# Patient Record
Sex: FEMALE | Race: BLACK OR AFRICAN AMERICAN | NOT HISPANIC OR LATINO | URBAN - METROPOLITAN AREA
[De-identification: names, ages, dates, MRNs, and addresses within clinical notes are randomized per-mention and may not be internally consistent; named-entity substitution may affect disease eponyms.]

---

## 2024-08-05 ENCOUNTER — HOSPITAL ENCOUNTER (OUTPATIENT)
Facility: HOSPITAL | Age: 34
Discharge: HOME | End: 2024-08-05
Attending: STUDENT IN AN ORGANIZED HEALTH CARE EDUCATION/TRAINING PROGRAM | Admitting: STUDENT IN AN ORGANIZED HEALTH CARE EDUCATION/TRAINING PROGRAM
Payer: COMMERCIAL

## 2024-08-05 VITALS
TEMPERATURE: 97.3 F | BODY MASS INDEX: 43.23 KG/M2 | DIASTOLIC BLOOD PRESSURE: 77 MMHG | HEART RATE: 97 BPM | WEIGHT: 268.96 LBS | RESPIRATION RATE: 22 BRPM | OXYGEN SATURATION: 98 % | HEIGHT: 66 IN | SYSTOLIC BLOOD PRESSURE: 129 MMHG

## 2024-08-05 PROCEDURE — 2500000001 HC RX 250 WO HCPCS SELF ADMINISTERED DRUGS (ALT 637 FOR MEDICARE OP): Performed by: FAMILY MEDICINE

## 2024-08-05 PROCEDURE — 87086 URINE CULTURE/COLONY COUNT: CPT | Performed by: FAMILY MEDICINE

## 2024-08-05 PROCEDURE — 59025 FETAL NON-STRESS TEST: CPT | Performed by: FAMILY MEDICINE

## 2024-08-05 PROCEDURE — 99213 OFFICE O/P EST LOW 20 MIN: CPT | Performed by: FAMILY MEDICINE

## 2024-08-05 PROCEDURE — 87077 CULTURE AEROBIC IDENTIFY: CPT | Performed by: FAMILY MEDICINE

## 2024-08-05 PROCEDURE — 99215 OFFICE O/P EST HI 40 MIN: CPT | Mod: 25

## 2024-08-05 RX ORDER — CYCLOBENZAPRINE HCL 10 MG
10 TABLET ORAL ONCE
Status: COMPLETED | OUTPATIENT
Start: 2024-08-05 | End: 2024-08-05

## 2024-08-05 RX ORDER — HYDRALAZINE HYDROCHLORIDE 20 MG/ML
5 INJECTION INTRAMUSCULAR; INTRAVENOUS ONCE AS NEEDED
Status: DISCONTINUED | OUTPATIENT
Start: 2024-08-05 | End: 2024-08-05 | Stop reason: HOSPADM

## 2024-08-05 RX ORDER — NIFEDIPINE 10 MG/1
10 CAPSULE ORAL ONCE AS NEEDED
Status: DISCONTINUED | OUTPATIENT
Start: 2024-08-05 | End: 2024-08-05 | Stop reason: HOSPADM

## 2024-08-05 RX ORDER — ACETAMINOPHEN 325 MG/1
975 TABLET ORAL ONCE
Status: COMPLETED | OUTPATIENT
Start: 2024-08-05 | End: 2024-08-05

## 2024-08-05 RX ORDER — LIDOCAINE HYDROCHLORIDE 10 MG/ML
0.5 INJECTION INFILTRATION; PERINEURAL ONCE AS NEEDED
Status: DISCONTINUED | OUTPATIENT
Start: 2024-08-05 | End: 2024-08-05 | Stop reason: HOSPADM

## 2024-08-05 RX ORDER — LABETALOL HYDROCHLORIDE 5 MG/ML
20 INJECTION, SOLUTION INTRAVENOUS ONCE AS NEEDED
Status: DISCONTINUED | OUTPATIENT
Start: 2024-08-05 | End: 2024-08-05 | Stop reason: HOSPADM

## 2024-08-05 SDOH — SOCIAL STABILITY: SOCIAL INSECURITY: DO YOU FEEL ANYONE HAS EXPLOITED OR TAKEN ADVANTAGE OF YOU FINANCIALLY OR OF YOUR PERSONAL PROPERTY?: NO

## 2024-08-05 SDOH — ECONOMIC STABILITY: HOUSING INSECURITY: DO YOU FEEL UNSAFE GOING BACK TO THE PLACE WHERE YOU ARE LIVING?: NO

## 2024-08-05 SDOH — SOCIAL STABILITY: SOCIAL INSECURITY: ABUSE SCREEN: ADULT

## 2024-08-05 SDOH — SOCIAL STABILITY: SOCIAL INSECURITY: HAVE YOU HAD THOUGHTS OF HARMING ANYONE ELSE?: NO

## 2024-08-05 SDOH — SOCIAL STABILITY: SOCIAL INSECURITY: VERBAL ABUSE: DENIES

## 2024-08-05 SDOH — HEALTH STABILITY: MENTAL HEALTH: HAVE YOU USED ANY SUBSTANCES (CANABIS, COCAINE, HEROIN, HALLUCINOGENS, INHALANTS, ETC.) IN THE PAST 12 MONTHS?: NO

## 2024-08-05 SDOH — HEALTH STABILITY: MENTAL HEALTH: NON-SPECIFIC ACTIVE SUICIDAL THOUGHTS (PAST 1 MONTH): NO

## 2024-08-05 SDOH — HEALTH STABILITY: MENTAL HEALTH: WISH TO BE DEAD (PAST 1 MONTH): NO

## 2024-08-05 SDOH — SOCIAL STABILITY: SOCIAL INSECURITY: HAS ANYONE EVER THREATENED TO HURT YOUR FAMILY OR YOUR PETS?: NO

## 2024-08-05 SDOH — SOCIAL STABILITY: SOCIAL INSECURITY: ARE THERE ANY APPARENT SIGNS OF INJURIES/BEHAVIORS THAT COULD BE RELATED TO ABUSE/NEGLECT?: NO

## 2024-08-05 SDOH — HEALTH STABILITY: MENTAL HEALTH: WERE YOU ABLE TO COMPLETE ALL THE BEHAVIORAL HEALTH SCREENINGS?: YES

## 2024-08-05 SDOH — HEALTH STABILITY: MENTAL HEALTH: SUICIDAL BEHAVIOR (LIFETIME): NO

## 2024-08-05 SDOH — SOCIAL STABILITY: SOCIAL INSECURITY: DOES ANYONE TRY TO KEEP YOU FROM HAVING/CONTACTING OTHER FRIENDS OR DOING THINGS OUTSIDE YOUR HOME?: NO

## 2024-08-05 SDOH — SOCIAL STABILITY: SOCIAL INSECURITY: PHYSICAL ABUSE: DENIES

## 2024-08-05 SDOH — SOCIAL STABILITY: SOCIAL INSECURITY: ARE YOU OR HAVE YOU BEEN THREATENED OR ABUSED PHYSICALLY, EMOTIONALLY, OR SEXUALLY BY ANYONE?: NO

## 2024-08-05 SDOH — SOCIAL STABILITY: SOCIAL INSECURITY: HAVE YOU HAD ANY THOUGHTS OF HARMING ANYONE ELSE?: NO

## 2024-08-05 SDOH — HEALTH STABILITY: MENTAL HEALTH: HAVE YOU USED ANY PRESCRIPTION DRUGS OTHER THAN PRESCRIBED IN THE PAST 12 MONTHS?: NO

## 2024-08-05 ASSESSMENT — LIFESTYLE VARIABLES
SKIP TO QUESTIONS 9-10: 1
HOW MANY STANDARD DRINKS CONTAINING ALCOHOL DO YOU HAVE ON A TYPICAL DAY: PATIENT DOES NOT DRINK
AUDIT-C TOTAL SCORE: 0
HOW OFTEN DO YOU HAVE A DRINK CONTAINING ALCOHOL: NEVER
HOW OFTEN DO YOU HAVE 6 OR MORE DRINKS ON ONE OCCASION: NEVER
AUDIT-C TOTAL SCORE: 0

## 2024-08-05 ASSESSMENT — PATIENT HEALTH QUESTIONNAIRE - PHQ9
1. LITTLE INTEREST OR PLEASURE IN DOING THINGS: NOT AT ALL
SUM OF ALL RESPONSES TO PHQ9 QUESTIONS 1 & 2: 0
2. FEELING DOWN, DEPRESSED OR HOPELESS: NOT AT ALL

## 2024-08-05 NOTE — H&P
Obstetrical TRIAGE History and Physical     Dot Niño is a 33 y.o. . 38w4d dated by 7w6d US. Receives prenatal care at [facility]. Presenting to OB triage for abd pain.       Chief Complaint: No chief complaint on file.    Assessment/Plan      Abd pain  - generalized abd pain started early this AM  - SVE: fingertip/30/-3  - 8/10 - >Tylenol, Flexeril -> 5/10  - GBS collected  - low concern for labor at this time  - discussed labor signs, FMCs, warning signs, when to return for eval, pt verb understanding    IUP at 38w4d  - NST reactive   - Good fetal movement  - Continue routine prenatal care    Maternal Well-being  - Vital signs stable and WNL   - All questions and concerns addressed  - rubella equivocal, to offer vaccine post partum    Dispo  - patient appropriate for d/c home, agrees with plan   - f/u at next scheduled OB appt or to triage sooner as needed       Discussed plan and reviewed tracing with Dr. Colon-Von Abreu, APRN-CNP      Active Problems:  There are no active Hospital Problems.      Pregnancy Problems (from 24 to present)       No problems associated with this episode.          Subjective   Dot is here for abd pain that started early this morning. Denies loss of fluid, vaginal bleeding, decreased fetal movement. She has not taken medication for the sx.         Obstetrical History   OB History    Para Term  AB Living   4 3 3     2   SAB IAB Ectopic Multiple Live Births           2      # Outcome Date GA Lbr Joseph/2nd Weight Sex Type Anes PTL Lv   4 Current            3 Term 12/12/15     Vag-Spont      2 Term 14     Vag-Spont   EFRA   1 Term 13     Vag-Spont   EFRA       Past Medical History  Past Medical History:   Diagnosis Date    Encounter for full-term uncomplicated delivery ([facility]) 2014    Normal delivery    Encounter for screening for malignant neoplasm of cervix 2022    Cervical cancer screening    Encounter for supervision of  normal pregnancy, unspecified, unspecified trimester ([facility]) 05/12/2014    Pregnancy    Personal history of diseases of the blood and blood-forming organs and certain disorders involving the immune mechanism     History of anemia    Personal history of other endocrine, nutritional and metabolic disease     History of obesity    Personal history of other specified conditions     History of vomiting    Urinary tract infection, site not specified 01/03/2014    Urinary tract infection        Past Surgical History   No past surgical history on file.    Social History  Social History     Tobacco Use    Smoking status: Not on file    Smokeless tobacco: Not on file   Substance Use Topics    Alcohol use: Not on file     Substance and Sexual Activity   Drug Use Not on file       Allergies  Patient has no known allergies.     Medications  No medications prior to admission.       Objective    Last Vitals  Temp Pulse Resp BP MAP O2 Sat   36.3 °C (97.3 °F) 97 22 129/77 97 98 %     Physical Examination  Physical Exam  Constitutional:       Appearance: Normal appearance.   HENT:      Head: Normocephalic.   Cardiovascular:      Rate and Rhythm: Normal rate.      Heart sounds: Normal heart sounds.   Pulmonary:      Effort: Pulmonary effort is normal.   Abdominal:      Palpations: Abdomen is soft.   Genitourinary:     Comments: , mod variability, +accels, -decels  TOCO irreg ctx    SVE: fingertip/30/-3    Skin:     General: Skin is warm and dry.   Neurological:      Mental Status: She is alert and oriented to person, place, and time.   Psychiatric:         Mood and Affect: Mood normal.         Behavior: Behavior normal.         Lab Review  No visits with results within 1 Day(s) from this visit.   Latest known visit with results is:   Legacy Encounter on 02/09/2023   Component Date Value Ref Range Status    Trichomonas Vaginalis 02/09/2023 NEGATIVE  Negative Final    Comment:  The APTIMA Trichomonas vaginalis assay is  FDA-approved for    testing on female endocervical swabs, vaginal swabs, and    ThinPrep liquid pap samples. Performance characteristics    for Trichomonas vaginalis on specific non-FDA-approved    sample types (female and male urine and male urethral    swabs) have been validated by [facility]. This laboratory is certified by    CLIA to perform high complexity testing. Samples from all    other sites are not validated for this method.      Neisseria gonorrhea,Amplified 02/09/2023 NEGATIVE  Negative Final    Comment:  The APTIMA Combo 2 assay is FDA-approved for Chlamydia    trachomatis and Neisseria gonorrhoeae testing on female    endocervical and vaginal swabs, ThinPrep liquid pap    samples, male urine samples and urethral swabs.    Performance characteristics for Chlamydia trachomatis and    Neisseria gonorrhoeae testing on specific non-FDA-approved    sample types (female urine samples) have been validated by    [facility]. This    laboratory is certified by CLIA to perform high complexity    testing. Samples from all other sites are not validated    for this method.      Chlamydia trachomatis, Amplified 02/09/2023 NEGATIVE  Negative Final    Comment:  The APTIMA Combo 2 assay is FDA-approved for Chlamydia    trachomatis and Neisseria gonorrhoeae testing on female    endocervical and vaginal swabs, ThinPrep liquid pap    samples, male urine samples and urethral swabs.    Performance characteristics for Chlamydia trachomatis and    Neisseria gonorrhoeae testing on specific non-FDA-approved    sample types (female urine samples) have been validated by    [facility]. This    laboratory is certified by CLIA to perform high complexity    testing. Samples from all other sites are not validated    for this method.

## 2024-08-06 LAB — BACTERIA UR CULT: NORMAL

## 2024-08-08 LAB — GP B STREP GENITAL QL CULT: ABNORMAL

## 2024-08-12 ENCOUNTER — TELEPHONE (OUTPATIENT)
Dept: OBSTETRICS AND GYNECOLOGY | Facility: HOSPITAL | Age: 34
End: 2024-08-12
Payer: COMMERCIAL

## 2024-08-16 ENCOUNTER — ANESTHESIA EVENT (OUTPATIENT)
Dept: OBSTETRICS AND GYNECOLOGY | Facility: HOSPITAL | Age: 34
End: 2024-08-16
Payer: COMMERCIAL

## 2024-08-16 ENCOUNTER — HOSPITAL ENCOUNTER (INPATIENT)
Facility: HOSPITAL | Age: 34
End: 2024-08-16
Attending: STUDENT IN AN ORGANIZED HEALTH CARE EDUCATION/TRAINING PROGRAM | Admitting: STUDENT IN AN ORGANIZED HEALTH CARE EDUCATION/TRAINING PROGRAM
Payer: COMMERCIAL

## 2024-08-16 ENCOUNTER — ANESTHESIA (OUTPATIENT)
Dept: OBSTETRICS AND GYNECOLOGY | Facility: HOSPITAL | Age: 34
End: 2024-08-16
Payer: COMMERCIAL

## 2024-08-16 DIAGNOSIS — D62 ABLA (ACUTE BLOOD LOSS ANEMIA): ICD-10-CM

## 2024-08-16 PROBLEM — J45.41 MODERATE PERSISTENT ASTHMA WITH ACUTE EXACERBATION (HHS-HCC): Status: ACTIVE | Noted: 2024-08-16

## 2024-08-16 LAB
ABO GROUP (TYPE) IN BLOOD: NORMAL
ANTIBODY SCREEN: NORMAL
BILIRUBIN, POC: NEGATIVE
BLOOD URINE, POC: NEGATIVE
CLARITY, POC: CLEAR
COLOR, POC: YELLOW
ERYTHROCYTE [DISTWIDTH] IN BLOOD BY AUTOMATED COUNT: 13.3 % (ref 11.5–14.5)
GLUCOSE URINE, POC: NEGATIVE
HCT VFR BLD AUTO: 33.7 % (ref 36–46)
HGB BLD-MCNC: 10.7 G/DL (ref 12–16)
KETONES, POC: NEGATIVE
LEUKOCYTE EST, POC: NEGATIVE
MCH RBC QN AUTO: 26.6 PG (ref 26–34)
MCHC RBC AUTO-ENTMCNC: 31.8 G/DL (ref 32–36)
MCV RBC AUTO: 84 FL (ref 80–100)
NITRITE, POC: NEGATIVE
NRBC BLD-RTO: 0 /100 WBCS (ref 0–0)
PH, POC: 7
PLATELET # BLD AUTO: 307 X10*3/UL (ref 150–450)
POC APPEARANCE OF BODY FLUID: CLEAR
RBC # BLD AUTO: 4.02 X10*6/UL (ref 4–5.2)
RH FACTOR (ANTIGEN D): NORMAL
SPECIFIC GRAVITY, POC: 1.02
TREPONEMA PALLIDUM IGG+IGM AB [PRESENCE] IN SERUM OR PLASMA BY IMMUNOASSAY: NONREACTIVE
URINE PROTEIN, POC: NEGATIVE
UROBILINOGEN, POC: 1
WBC # BLD AUTO: 4.6 X10*3/UL (ref 4.4–11.3)

## 2024-08-16 PROCEDURE — 86780 TREPONEMA PALLIDUM: CPT

## 2024-08-16 PROCEDURE — 36415 COLL VENOUS BLD VENIPUNCTURE: CPT

## 2024-08-16 PROCEDURE — 3700000014 EPIDURAL BLOCK: Performed by: ANESTHESIOLOGY

## 2024-08-16 PROCEDURE — 86870 RBC ANTIBODY IDENTIFICATION: CPT

## 2024-08-16 PROCEDURE — 85027 COMPLETE CBC AUTOMATED: CPT

## 2024-08-16 PROCEDURE — 1120000001 HC OB PRIVATE ROOM DAILY

## 2024-08-16 PROCEDURE — 01967 NEURAXL LBR ANES VAG DLVR: CPT | Performed by: ANESTHESIOLOGY

## 2024-08-16 PROCEDURE — 81002 URINALYSIS NONAUTO W/O SCOPE: CPT

## 2024-08-16 PROCEDURE — 86901 BLOOD TYPING SEROLOGIC RH(D): CPT

## 2024-08-16 PROCEDURE — 2500000005 HC RX 250 GENERAL PHARMACY W/O HCPCS

## 2024-08-16 PROCEDURE — 7210000002 HC LABOR PER HOUR

## 2024-08-16 PROCEDURE — 2500000004 HC RX 250 GENERAL PHARMACY W/ HCPCS (ALT 636 FOR OP/ED)

## 2024-08-16 PROCEDURE — 2500000001 HC RX 250 WO HCPCS SELF ADMINISTERED DRUGS (ALT 637 FOR MEDICARE OP)

## 2024-08-16 PROCEDURE — 3E033VJ INTRODUCTION OF OTHER HORMONE INTO PERIPHERAL VEIN, PERCUTANEOUS APPROACH: ICD-10-PCS

## 2024-08-16 PROCEDURE — 99214 OFFICE O/P EST MOD 30 MIN: CPT

## 2024-08-16 PROCEDURE — 2500000002 HC RX 250 W HCPCS SELF ADMINISTERED DRUGS (ALT 637 FOR MEDICARE OP, ALT 636 FOR OP/ED)

## 2024-08-16 PROCEDURE — 2500000002 HC RX 250 W HCPCS SELF ADMINISTERED DRUGS (ALT 637 FOR MEDICARE OP, ALT 636 FOR OP/ED): Performed by: STUDENT IN AN ORGANIZED HEALTH CARE EDUCATION/TRAINING PROGRAM

## 2024-08-16 PROCEDURE — 86922 COMPATIBILITY TEST ANTIGLOB: CPT

## 2024-08-16 RX ORDER — FENTANYL/BUPIVACAINE/NS/PF 2MCG/ML-.1
PLASTIC BAG, INJECTION (ML) INJECTION AS NEEDED
Status: DISCONTINUED | OUTPATIENT
Start: 2024-08-16 | End: 2024-08-16

## 2024-08-16 RX ORDER — IPRATROPIUM BROMIDE AND ALBUTEROL SULFATE 2.5; .5 MG/3ML; MG/3ML
3 SOLUTION RESPIRATORY (INHALATION) ONCE
Status: COMPLETED | OUTPATIENT
Start: 2024-08-16 | End: 2024-08-16

## 2024-08-16 RX ORDER — ONDANSETRON 4 MG/1
4 TABLET, FILM COATED ORAL EVERY 6 HOURS PRN
Status: DISCONTINUED | OUTPATIENT
Start: 2024-08-16 | End: 2024-08-17

## 2024-08-16 RX ORDER — METOCLOPRAMIDE HYDROCHLORIDE 5 MG/ML
10 INJECTION INTRAMUSCULAR; INTRAVENOUS EVERY 6 HOURS PRN
Status: DISCONTINUED | OUTPATIENT
Start: 2024-08-16 | End: 2024-08-17

## 2024-08-16 RX ORDER — TERBUTALINE SULFATE 1 MG/ML
0.25 INJECTION SUBCUTANEOUS ONCE AS NEEDED
Status: DISCONTINUED | OUTPATIENT
Start: 2024-08-16 | End: 2024-08-17

## 2024-08-16 RX ORDER — METHYLERGONOVINE MALEATE 0.2 MG/ML
0.2 INJECTION INTRAVENOUS ONCE AS NEEDED
Status: DISCONTINUED | OUTPATIENT
Start: 2024-08-16 | End: 2024-08-17

## 2024-08-16 RX ORDER — HYDRALAZINE HYDROCHLORIDE 20 MG/ML
5 INJECTION INTRAMUSCULAR; INTRAVENOUS ONCE AS NEEDED
Status: DISCONTINUED | OUTPATIENT
Start: 2024-08-16 | End: 2024-08-17

## 2024-08-16 RX ORDER — METOCLOPRAMIDE 10 MG/1
10 TABLET ORAL EVERY 6 HOURS PRN
Status: DISCONTINUED | OUTPATIENT
Start: 2024-08-16 | End: 2024-08-17

## 2024-08-16 RX ORDER — OXYTOCIN/0.9 % SODIUM CHLORIDE 30/500 ML
2-30 PLASTIC BAG, INJECTION (ML) INTRAVENOUS CONTINUOUS
Status: DISCONTINUED | OUTPATIENT
Start: 2024-08-16 | End: 2024-08-17

## 2024-08-16 RX ORDER — LOPERAMIDE HYDROCHLORIDE 2 MG/1
4 CAPSULE ORAL EVERY 2 HOUR PRN
Status: DISCONTINUED | OUTPATIENT
Start: 2024-08-16 | End: 2024-08-17

## 2024-08-16 RX ORDER — NIFEDIPINE 10 MG/1
10 CAPSULE ORAL ONCE AS NEEDED
Status: DISCONTINUED | OUTPATIENT
Start: 2024-08-16 | End: 2024-08-17

## 2024-08-16 RX ORDER — LABETALOL HYDROCHLORIDE 5 MG/ML
20 INJECTION, SOLUTION INTRAVENOUS ONCE AS NEEDED
Status: DISCONTINUED | OUTPATIENT
Start: 2024-08-16 | End: 2024-08-17

## 2024-08-16 RX ORDER — SWAB
1 SWAB, NON-MEDICATED MISCELLANEOUS
COMMUNITY
Start: 2024-01-18 | End: 2024-08-20 | Stop reason: HOSPADM

## 2024-08-16 RX ORDER — FENTANYL/BUPIVACAINE/NS/PF 2MCG/ML-.1
PLASTIC BAG, INJECTION (ML) INJECTION AS NEEDED
Status: DISCONTINUED | OUTPATIENT
Start: 2024-08-16 | End: 2024-08-17

## 2024-08-16 RX ORDER — SODIUM CHLORIDE, SODIUM LACTATE, POTASSIUM CHLORIDE, CALCIUM CHLORIDE 600; 310; 30; 20 MG/100ML; MG/100ML; MG/100ML; MG/100ML
125 INJECTION, SOLUTION INTRAVENOUS CONTINUOUS
Status: DISCONTINUED | OUTPATIENT
Start: 2024-08-16 | End: 2024-08-17

## 2024-08-16 RX ORDER — FENTANYL/BUPIVACAINE/NS/PF 2MCG/ML-.1
0-54 PLASTIC BAG, INJECTION (ML) INJECTION CONTINUOUS
Status: DISCONTINUED | OUTPATIENT
Start: 2024-08-17 | End: 2024-08-17

## 2024-08-16 RX ORDER — OXYTOCIN/0.9 % SODIUM CHLORIDE 30/500 ML
60 PLASTIC BAG, INJECTION (ML) INTRAVENOUS ONCE AS NEEDED
Status: DISCONTINUED | OUTPATIENT
Start: 2024-08-16 | End: 2024-08-17

## 2024-08-16 RX ORDER — TRANEXAMIC ACID 100 MG/ML
1000 INJECTION, SOLUTION INTRAVENOUS ONCE AS NEEDED
Status: DISCONTINUED | OUTPATIENT
Start: 2024-08-16 | End: 2024-08-17

## 2024-08-16 RX ORDER — ONDANSETRON HYDROCHLORIDE 2 MG/ML
4 INJECTION, SOLUTION INTRAVENOUS EVERY 6 HOURS PRN
Status: DISCONTINUED | OUTPATIENT
Start: 2024-08-16 | End: 2024-08-17

## 2024-08-16 RX ORDER — IPRATROPIUM BROMIDE AND ALBUTEROL SULFATE 2.5; .5 MG/3ML; MG/3ML
3 SOLUTION RESPIRATORY (INHALATION)
Status: DISCONTINUED | OUTPATIENT
Start: 2024-08-16 | End: 2024-08-20 | Stop reason: HOSPADM

## 2024-08-16 RX ORDER — LIDOCAINE HYDROCHLORIDE 10 MG/ML
30 INJECTION INFILTRATION; PERINEURAL ONCE AS NEEDED
Status: DISCONTINUED | OUTPATIENT
Start: 2024-08-16 | End: 2024-08-17

## 2024-08-16 RX ORDER — CARBOPROST TROMETHAMINE 250 UG/ML
250 INJECTION, SOLUTION INTRAMUSCULAR ONCE AS NEEDED
Status: DISCONTINUED | OUTPATIENT
Start: 2024-08-16 | End: 2024-08-17

## 2024-08-16 RX ORDER — FLUTICASONE FUROATE AND VILANTEROL 200; 25 UG/1; UG/1
1 POWDER RESPIRATORY (INHALATION)
Status: DISCONTINUED | OUTPATIENT
Start: 2024-08-16 | End: 2024-08-20 | Stop reason: HOSPADM

## 2024-08-16 RX ORDER — LIDOCAINE HYDROCHLORIDE 10 MG/ML
0.5 INJECTION INFILTRATION; PERINEURAL ONCE AS NEEDED
Status: DISCONTINUED | OUTPATIENT
Start: 2024-08-16 | End: 2024-08-17

## 2024-08-16 RX ORDER — OXYTOCIN 10 [USP'U]/ML
10 INJECTION, SOLUTION INTRAMUSCULAR; INTRAVENOUS ONCE AS NEEDED
Status: DISCONTINUED | OUTPATIENT
Start: 2024-08-16 | End: 2024-08-17

## 2024-08-16 RX ORDER — MISOPROSTOL 200 UG/1
800 TABLET ORAL ONCE AS NEEDED
Status: DISCONTINUED | OUTPATIENT
Start: 2024-08-16 | End: 2024-08-17

## 2024-08-16 RX ORDER — PENICILLIN G 3000000 [IU]/50ML
3 INJECTION, SOLUTION INTRAVENOUS EVERY 4 HOURS
Status: DISCONTINUED | OUTPATIENT
Start: 2024-08-16 | End: 2024-08-17

## 2024-08-16 RX ORDER — ACETAMINOPHEN 325 MG/1
975 TABLET ORAL EVERY 6 HOURS PRN
Status: DISCONTINUED | OUTPATIENT
Start: 2024-08-16 | End: 2024-08-17

## 2024-08-16 RX ORDER — LIDOCAINE HCL/EPINEPHRINE/PF 2%-1:200K
VIAL (ML) INJECTION AS NEEDED
Status: DISCONTINUED | OUTPATIENT
Start: 2024-08-16 | End: 2024-08-17

## 2024-08-16 RX ORDER — BUDESONIDE AND FORMOTEROL FUMARATE DIHYDRATE 160; 4.5 UG/1; UG/1
2 AEROSOL RESPIRATORY (INHALATION) 2 TIMES DAILY
COMMUNITY
Start: 2024-05-22

## 2024-08-16 RX ADMIN — FENTANYL CITRATE 5 ML: 50 INJECTION INTRAMUSCULAR; INTRAVENOUS at 23:49

## 2024-08-16 RX ADMIN — LIDOCAINE HYDROCHLORIDE,EPINEPHRINE BITARTRATE 3 ML: 20; .005 INJECTION, SOLUTION EPIDURAL; INFILTRATION; INTRACAUDAL; PERINEURAL at 23:45

## 2024-08-16 RX ADMIN — FENTANYL CITRATE 14 ML/HR: 50 INJECTION, SOLUTION INTRAMUSCULAR; INTRAVENOUS at 23:51

## 2024-08-16 RX ADMIN — FENTANYL CITRATE 5 ML: 50 INJECTION INTRAMUSCULAR; INTRAVENOUS at 23:47

## 2024-08-16 SDOH — HEALTH STABILITY: MENTAL HEALTH: WISH TO BE DEAD (PAST 1 MONTH): NO

## 2024-08-16 SDOH — HEALTH STABILITY: MENTAL HEALTH: NON-SPECIFIC ACTIVE SUICIDAL THOUGHTS (PAST 1 MONTH): NO

## 2024-08-16 SDOH — SOCIAL STABILITY: SOCIAL INSECURITY: ARE THERE ANY APPARENT SIGNS OF INJURIES/BEHAVIORS THAT COULD BE RELATED TO ABUSE/NEGLECT?: NO

## 2024-08-16 SDOH — SOCIAL STABILITY: SOCIAL INSECURITY: VERBAL ABUSE: DENIES

## 2024-08-16 SDOH — HEALTH STABILITY: MENTAL HEALTH: SUICIDAL BEHAVIOR (LIFETIME): NO

## 2024-08-16 SDOH — HEALTH STABILITY: MENTAL HEALTH: HAVE YOU USED ANY SUBSTANCES (CANABIS, COCAINE, HEROIN, HALLUCINOGENS, INHALANTS, ETC.) IN THE PAST 12 MONTHS?: NO

## 2024-08-16 SDOH — HEALTH STABILITY: MENTAL HEALTH: WERE YOU ABLE TO COMPLETE ALL THE BEHAVIORAL HEALTH SCREENINGS?: YES

## 2024-08-16 SDOH — SOCIAL STABILITY: SOCIAL INSECURITY: HAS ANYONE EVER THREATENED TO HURT YOUR FAMILY OR YOUR PETS?: NO

## 2024-08-16 SDOH — SOCIAL STABILITY: SOCIAL INSECURITY: HAVE YOU HAD THOUGHTS OF HARMING ANYONE ELSE?: NO

## 2024-08-16 SDOH — SOCIAL STABILITY: SOCIAL INSECURITY: PHYSICAL ABUSE: DENIES

## 2024-08-16 SDOH — HEALTH STABILITY: MENTAL HEALTH: CURRENT SMOKER: 0

## 2024-08-16 SDOH — SOCIAL STABILITY: SOCIAL INSECURITY: ABUSE SCREEN: ADULT

## 2024-08-16 SDOH — HEALTH STABILITY: MENTAL HEALTH: HAVE YOU USED ANY PRESCRIPTION DRUGS OTHER THAN PRESCRIBED IN THE PAST 12 MONTHS?: NO

## 2024-08-16 SDOH — SOCIAL STABILITY: SOCIAL INSECURITY: DOES ANYONE TRY TO KEEP YOU FROM HAVING/CONTACTING OTHER FRIENDS OR DOING THINGS OUTSIDE YOUR HOME?: NO

## 2024-08-16 SDOH — SOCIAL STABILITY: SOCIAL INSECURITY: ARE YOU OR HAVE YOU BEEN THREATENED OR ABUSED PHYSICALLY, EMOTIONALLY, OR SEXUALLY BY ANYONE?: NO

## 2024-08-16 SDOH — SOCIAL STABILITY: SOCIAL INSECURITY: DO YOU FEEL ANYONE HAS EXPLOITED OR TAKEN ADVANTAGE OF YOU FINANCIALLY OR OF YOUR PERSONAL PROPERTY?: NO

## 2024-08-16 SDOH — ECONOMIC STABILITY: HOUSING INSECURITY: DO YOU FEEL UNSAFE GOING BACK TO THE PLACE WHERE YOU ARE LIVING?: NO

## 2024-08-16 ASSESSMENT — PAIN SCALES - GENERAL
PAINLEVEL_OUTOF10: 7
PAINLEVEL_OUTOF10: 2
PAINLEVEL_OUTOF10: 4
PAINLEVEL_OUTOF10: 5 - MODERATE PAIN
PAINLEVEL_OUTOF10: 0 - NO PAIN
PAINLEVEL_OUTOF10: 6
PAINLEVEL_OUTOF10: 0 - NO PAIN
PAINLEVEL_OUTOF10: 4
PAINLEVEL_OUTOF10: 0 - NO PAIN

## 2024-08-16 ASSESSMENT — LIFESTYLE VARIABLES
AUDIT-C TOTAL SCORE: 0
HOW MANY STANDARD DRINKS CONTAINING ALCOHOL DO YOU HAVE ON A TYPICAL DAY: PATIENT DOES NOT DRINK
HOW OFTEN DO YOU HAVE 6 OR MORE DRINKS ON ONE OCCASION: NEVER
AUDIT-C TOTAL SCORE: 0
HOW OFTEN DO YOU HAVE A DRINK CONTAINING ALCOHOL: NEVER
SKIP TO QUESTIONS 9-10: 1

## 2024-08-16 ASSESSMENT — ACTIVITIES OF DAILY LIVING (ADL)
PATIENT'S MEMORY ADEQUATE TO SAFELY COMPLETE DAILY ACTIVITIES?: YES
BATHING: INDEPENDENT
ADEQUATE_TO_COMPLETE_ADL: YES
HEARING - RIGHT EAR: FUNCTIONAL
FEEDING YOURSELF: INDEPENDENT
GROOMING: INDEPENDENT
JUDGMENT_ADEQUATE_SAFELY_COMPLETE_DAILY_ACTIVITIES: YES
DRESSING YOURSELF: INDEPENDENT
HEARING - LEFT EAR: FUNCTIONAL
WALKS IN HOME: INDEPENDENT
LACK_OF_TRANSPORTATION: NO
TOILETING: INDEPENDENT

## 2024-08-16 ASSESSMENT — PAIN DESCRIPTION - DESCRIPTORS
DESCRIPTORS: HEADACHE

## 2024-08-16 ASSESSMENT — PATIENT HEALTH QUESTIONNAIRE - PHQ9
1. LITTLE INTEREST OR PLEASURE IN DOING THINGS: NOT AT ALL
2. FEELING DOWN, DEPRESSED OR HOPELESS: NOT AT ALL
SUM OF ALL RESPONSES TO PHQ9 QUESTIONS 1 & 2: 0

## 2024-08-16 ASSESSMENT — PAIN DESCRIPTION - LOCATION: LOCATION: HEAD

## 2024-08-16 NOTE — ANESTHESIA PREPROCEDURE EVALUATION
Patient: Dot Niño    Evaluation Method: In-person visit    Procedure Information    Date: 08/16/24  Procedure: Labor Consult         Relevant Problems   Pulmonary   (+) Moderate persistent asthma with acute exacerbation ([facility])       Clinical information reviewed:   Tobacco  Allergies  Meds   Med Hx  Surg Hx   Fam Hx          NPO Detail:  No data recorded     OB/Gyn Evaluation    Present Pregnancy    Patient is pregnant now.   Obstetric History                Physical Exam    Airway  Mallampati: III  TM distance: <3 FB     Cardiovascular - normal exam     Dental    Pulmonary   (+) wheezes     Abdominal            Anesthesia Plan    History of general anesthesia?: no  History of complications of general anesthesia?: no    ASA 2     epidural     The patient is not a current smoker.  Patient was previously instructed to abstain from smoking on day of procedure.  Patient did not smoke on day of procedure.    Anesthetic plan and risks discussed with patient.  Use of blood products discussed with patient who.    Plan discussed with attending.

## 2024-08-16 NOTE — CARE PLAN
The patient's goals for the shift include Begin induction process    The clinical goals for the shift include Demonstrate effective coping mechanisms through labor    VS and assessment stable. Patient demonstrating effective coping mechanisms.

## 2024-08-16 NOTE — PROGRESS NOTES
Intrapartum Progress Note    Assessment/Plan   Dot Niño is a 33 y.o.  at 40w1d, JULIANA: 8/15/2024, by Patient Reported admitted for IOL at term.    IOL  Method: CRB, Pit now at 4  Pain management: Epidural may be desired, not at this time  CEFM, currently Category I  GBS: positive, PCN indicated  Next exam: with CRB out    Asthma  S/p 2x Duo-Neb; pt symptomatically improved and oxygen saturation appropriate; peak flow improved 150 --> 200  2 hospitalizations this pregnancy for asthma  Estimated peak flow 295-419  Fluticasone inhaler daily, Duo-Neb PRN    Maternal conditions  Asthma, as above    Seen and discussed with Dr. Najma Pimentel MD  OBGYN    Subjective   Dot is feeling good at this time, no acute concerns.  Denies SOB, wheezing, or other pulmonary sx.  Reports that her last deliveries were all quick, less than 10 mins pushing per child.    Objective   Last Vitals:  Temp Pulse Resp BP MAP Pulse Ox   36.9 °C (98.4 °F) 95 19 129/75   100 %     Vitals Min/Max Last 24 Hours:  Temp  Min: 36 °C (96.8 °F)  Max: 36.9 °C (98.4 °F)  Pulse  Min: 75  Max: 115  Resp  Min: 19  Max: 21  BP  Min: 127/76  Max: 136/80    Intake/Output:  No intake or output data in the 24 hours ending 24 1850    Physical Examination:  General: no acute distress  HEENT: normocephalic, atraumatic  Heart: warm and well perfused  Lungs: breathing comfortably on room air  Abdomen: gravid  Extremities: moving all extremities  Neuro: awake and conversant  Psych: appropriate mood and affect    SVE: 50/-3 with CRB placement  FHT: 145/mod/+accel/-decel  [place]: q5min    Lab Review:  Labs in chart have been reviewed    Lab Results   Component Value Date    WBC 4.6 2024    HGB 10.7 (L) 2024    HCT 33.7 (L) 2024     2024    ALT 35 10/28/2017    AST 27 10/28/2017     2020    K 4.7 2020     (H) 2020    CREATININE 0.83 2020    BUN 14 2020    CO2 20 (L)  11/01/2020    INR 1.0 11/01/2020

## 2024-08-16 NOTE — H&P
OB Admission H&P    ASSESSMENT & PLAN: Dot Niño is a 33 y.o.  at 40w1d who presents for contractions, now admitted for IOL at term.    Plan:     IOL  - admitted, consented, scanned for cephalic  - Discussed risks and benefits of vaginal delivery including bleeding, infection, injury to surrounding structures. Pt expressed understanding, strongly desires vaginal delivery.   - Plan for IOL per primary team  - type & screen, CBC on admission   - epidural/IV pain meds per pt request  - PPBC: Depo    Asthma exacerbation   - Increased WOB and wheezing on exam. Oxygen saturation appropriate.  - 2 hospitalizations this pregnancy for asthma exacerbations  - Patient reports taking 3 inhalers, but cannot recall name of one. Symbicort is only med listed in home meds list. Per chart, patient was discharged in  on Symbicort 160 mg 2 puffs BID & PRN up to 8 puffs/day with course of oral prednisone.   - Estimated peak flow 295-419  - Actual peak flow 150 with slight improvement to 170 after Duo-Neb. Will administer additional Duo-Nebs q20 minutes for up to 3 doses as needed  - goal O2 sat >95%    Fetal wellbeing  - CEFM, currently Cat I  - GBS pos. For PCN ppx     Dispo: admit to L&D for IOL    Discussed with Dr. Colon-Von Funez MD, PGY-2      Subjective   32 yo  at 40w1d by 24wk US here for contractions. Patient having contractions every 7-8 minutes since yesterday. No LOF, VB, abnormal discharge, vaginal irritation, hematuria, or flank pain. Good FM.    Feels out of breath with walking, not while laying down. No F/C. Does not feel like she is wheezing.    Prenatal Provider [facility]    Pregnancy notables:  - Asthma. 2 hospitalizations at [facility] this pregnancy, last in . Takes 3 inhalers (symbicort, albuterol, and one she cannot recall). No intubations  - GBS pos  - Declined tdap    OB History    Para Term  AB Living   4 3 3 0 0 3   SAB IAB Ectopic Multiple Live Births   0 0 0 0 3       # Outcome Date GA Lbr Joseph/2nd Weight Sex Type Anes PTL Lv   4 Current            3 Term 12/12/15     Vag-Spont   EFRA   2 Term 05/12/14     Vag-Spont   EFRA   1 Term 01/16/13     Vag-Spont   EFRA       No past surgical history on file.    Social History     Tobacco Use    Smoking status: Not on file    Smokeless tobacco: Not on file   Substance Use Topics    Alcohol use: Not on file       No Known Allergies    Medications Prior to Admission   Medication Sig Dispense Refill Last Dose    budesonide-formoteroL (Symbicort) 160-4.5 mcg/actuation inhaler Inhale 2 puffs twice a day.       prenatal vit-iron fum-folic ac 28 mg iron- 800 mcg tablet Take 1 tablet by mouth once daily.        Objective     Last Vitals  Temp Pulse Resp BP MAP O2 Sat   36 °C (96.8 °F) 90 21 127/76 96 97 %         Physical Exam  General: NAD, mood appropriate  Cardiopulmonary: RRR. Increased work of breathing without posturing. Diffuse wheezing on exam.  Abdomen: Gravid, non-tender  Extremities: Symmetric without significant edema  Cervix: 1 /50 /-3      Fetal Monitoring  Baseline: 145 bpm, Variability: Moderate, Accelerations: Present and Decelerations: None  Uterine Activity: Contractions present, q10-15 minutes  Interpretation: Reactive    Bedside Ultrasound: YES, Findings- cephalic    Labs in chart were reviewed.     UA unremarkable

## 2024-08-16 NOTE — SIGNIFICANT EVENT
"Labor Progress Note    Subjective: Patient resting in bed. Mildly increased WOB, but patient reports her breathing is feeling significantly better. Amenable to CRB at this time.    Objective:  Vitals: /80   Pulse 104   Temp 36 °C (96.8 °F) (Temporal)   Resp 21   Ht 1.651 m (5' 5\")   Wt 124 kg (272 lb 4.3 oz)   SpO2 99%   BMI 45.31 kg/m²   SVE: 1/50/-3  FHT: 140/mod/+accels/intermittent variable decels  [place]: rare contractions  CRB placed successfully via speculum; about 50cc of bleeding when CRB placed    A/P:  IOL  S/p CRB placement, for pit after she eats lunch; AROM as appropriate  CEFM, currently Category II, due to variable decels; reassured by positive accels and moderate variability  ~50cc blood when CRB placed, due to cervical manipulation vs small abruption, but tracing reassuring, will continue to monitor  Epidural as requested  GBS positive, on PCN    Asthma  S/p 2x Duo-Neb; pt symptomatically improved and oxygen saturation appropriate; peak flow improved 150 --> 200  2 hospitalizations this pregnancy for asthma  Estimated peak flow 295-419  Fluticasone inhaler daily, Duo-Neb PRN    Andie Cohen, M4      "

## 2024-08-17 LAB
BASE EXCESS BLDCOV CALC-SCNC: -5.3 MMOL/L (ref -8.1–-0.5)
BLOOD EXPIRATION DATE: NORMAL
BODY TEMPERATURE: 37 DEGREES CELSIUS
DISPENSE STATUS: NORMAL
ERYTHROCYTE [DISTWIDTH] IN BLOOD BY AUTOMATED COUNT: 13.2 % (ref 11.5–14.5)
HCO3 BLDCOV-SCNC: 21.2 MMOL/L (ref 16–26)
HCT VFR BLD AUTO: 26.2 % (ref 36–46)
HGB BLD-MCNC: 8.1 G/DL (ref 12–16)
INHALED O2 CONCENTRATION: 21 %
MCH RBC QN AUTO: 26.6 PG (ref 26–34)
MCHC RBC AUTO-ENTMCNC: 30.9 G/DL (ref 32–36)
MCV RBC AUTO: 86 FL (ref 80–100)
NRBC BLD-RTO: 0 /100 WBCS (ref 0–0)
OXYHGB MFR BLDCOV: 47 % (ref 94–98)
PCO2 BLDCOV: 44 MM HG (ref 22–53)
PH BLDCOV: 7.29 PH (ref 7.19–7.47)
PLATELET # BLD AUTO: 243 X10*3/UL (ref 150–450)
PO2 BLDCOV: 26 MM HG (ref 13–37)
PRODUCT BLOOD TYPE: NORMAL
PRODUCT CODE: NORMAL
RBC # BLD AUTO: 3.05 X10*6/UL (ref 4–5.2)
SAO2 % BLDCOV: 48 % (ref 16–84)
UNIT ABO: NORMAL
UNIT NUMBER: NORMAL
UNIT RH: NORMAL
UNIT VOLUME: 280
WBC # BLD AUTO: 7.7 X10*3/UL (ref 4.4–11.3)
XM INTEP: NORMAL

## 2024-08-17 PROCEDURE — 59514 CESAREAN DELIVERY ONLY: CPT | Performed by: STUDENT IN AN ORGANIZED HEALTH CARE EDUCATION/TRAINING PROGRAM

## 2024-08-17 PROCEDURE — 4A1H7CZ MONITORING OF PRODUCTS OF CONCEPTION, CARDIAC RATE, VIA NATURAL OR ARTIFICIAL OPENING: ICD-10-PCS

## 2024-08-17 PROCEDURE — 7100000016 HC LABOR RECOVERY PER HOUR

## 2024-08-17 PROCEDURE — 2500000002 HC RX 250 W HCPCS SELF ADMINISTERED DRUGS (ALT 637 FOR MEDICARE OP, ALT 636 FOR OP/ED)

## 2024-08-17 PROCEDURE — 2500000004 HC RX 250 GENERAL PHARMACY W/ HCPCS (ALT 636 FOR OP/ED)

## 2024-08-17 PROCEDURE — 88307 TISSUE EXAM BY PATHOLOGIST: CPT | Mod: TC,SUR

## 2024-08-17 PROCEDURE — 2500000005 HC RX 250 GENERAL PHARMACY W/O HCPCS

## 2024-08-17 PROCEDURE — 2500000001 HC RX 250 WO HCPCS SELF ADMINISTERED DRUGS (ALT 637 FOR MEDICARE OP)

## 2024-08-17 PROCEDURE — 3700000014 HC AN EPIDURAL BLOCK CHARGE: Performed by: STUDENT IN AN ORGANIZED HEALTH CARE EDUCATION/TRAINING PROGRAM

## 2024-08-17 PROCEDURE — 2720000007 HC OR 272 NO HCPCS: Performed by: STUDENT IN AN ORGANIZED HEALTH CARE EDUCATION/TRAINING PROGRAM

## 2024-08-17 PROCEDURE — 7100000016 HC LABOR RECOVERY PER HOUR: Performed by: STUDENT IN AN ORGANIZED HEALTH CARE EDUCATION/TRAINING PROGRAM

## 2024-08-17 PROCEDURE — 1120000001 HC OB PRIVATE ROOM DAILY

## 2024-08-17 PROCEDURE — 10H07YZ INSERTION OF OTHER DEVICE INTO PRODUCTS OF CONCEPTION, VIA NATURAL OR ARTIFICIAL OPENING: ICD-10-PCS

## 2024-08-17 PROCEDURE — 82805 BLOOD GASES W/O2 SATURATION: CPT

## 2024-08-17 PROCEDURE — 10907ZC DRAINAGE OF AMNIOTIC FLUID, THERAPEUTIC FROM PRODUCTS OF CONCEPTION, VIA NATURAL OR ARTIFICIAL OPENING: ICD-10-PCS

## 2024-08-17 PROCEDURE — 59514 CESAREAN DELIVERY ONLY: CPT

## 2024-08-17 PROCEDURE — 85027 COMPLETE CBC AUTOMATED: CPT

## 2024-08-17 PROCEDURE — 3E0E77Z INTRODUCTION OF ELECTROLYTIC AND WATER BALANCE SUBSTANCE INTO PRODUCTS OF CONCEPTION, VIA NATURAL OR ARTIFICIAL OPENING: ICD-10-PCS | Performed by: STUDENT IN AN ORGANIZED HEALTH CARE EDUCATION/TRAINING PROGRAM

## 2024-08-17 PROCEDURE — 1100000001 HC PRIVATE ROOM DAILY

## 2024-08-17 PROCEDURE — 01968 ANES/ANALG CS DLVR NEURAXIAL: CPT | Performed by: ANESTHESIOLOGY

## 2024-08-17 PROCEDURE — 7210000002 HC LABOR PER HOUR

## 2024-08-17 RX ORDER — NALOXONE HYDROCHLORIDE 0.4 MG/ML
0.1 INJECTION, SOLUTION INTRAMUSCULAR; INTRAVENOUS; SUBCUTANEOUS EVERY 5 MIN PRN
Status: DISCONTINUED | OUTPATIENT
Start: 2024-08-17 | End: 2024-08-20 | Stop reason: HOSPADM

## 2024-08-17 RX ORDER — CEFAZOLIN 1 G/1
INJECTION, POWDER, FOR SOLUTION INTRAVENOUS AS NEEDED
Status: DISCONTINUED | OUTPATIENT
Start: 2024-08-17 | End: 2024-08-17

## 2024-08-17 RX ORDER — ADHESIVE BANDAGE
10 BANDAGE TOPICAL
Status: DISCONTINUED | OUTPATIENT
Start: 2024-08-17 | End: 2024-08-20 | Stop reason: HOSPADM

## 2024-08-17 RX ORDER — AZITHROMYCIN 100 MG/ML
INJECTION, POWDER, LYOPHILIZED, FOR SOLUTION INTRAVENOUS AS NEEDED
Status: DISCONTINUED | OUTPATIENT
Start: 2024-08-17 | End: 2024-08-17

## 2024-08-17 RX ORDER — OXYCODONE HYDROCHLORIDE 5 MG/1
5 TABLET ORAL EVERY 4 HOURS PRN
Status: DISCONTINUED | OUTPATIENT
Start: 2024-08-18 | End: 2024-08-20 | Stop reason: HOSPADM

## 2024-08-17 RX ORDER — FAMOTIDINE 10 MG/ML
INJECTION INTRAVENOUS AS NEEDED
Status: DISCONTINUED | OUTPATIENT
Start: 2024-08-17 | End: 2024-08-17

## 2024-08-17 RX ORDER — HYDROMORPHONE HYDROCHLORIDE 1 MG/ML
0.2 INJECTION, SOLUTION INTRAMUSCULAR; INTRAVENOUS; SUBCUTANEOUS EVERY 5 MIN PRN
Status: DISCONTINUED | OUTPATIENT
Start: 2024-08-17 | End: 2024-08-20 | Stop reason: HOSPADM

## 2024-08-17 RX ORDER — CARBOPROST TROMETHAMINE 250 UG/ML
250 INJECTION, SOLUTION INTRAMUSCULAR ONCE AS NEEDED
Status: DISCONTINUED | OUTPATIENT
Start: 2024-08-17 | End: 2024-08-20 | Stop reason: HOSPADM

## 2024-08-17 RX ORDER — DIPHENHYDRAMINE HYDROCHLORIDE 50 MG/ML
25 INJECTION INTRAMUSCULAR; INTRAVENOUS EVERY 4 HOURS PRN
Status: DISCONTINUED | OUTPATIENT
Start: 2024-08-17 | End: 2024-08-20 | Stop reason: HOSPADM

## 2024-08-17 RX ORDER — ONDANSETRON HYDROCHLORIDE 2 MG/ML
INJECTION, SOLUTION INTRAVENOUS AS NEEDED
Status: DISCONTINUED | OUTPATIENT
Start: 2024-08-17 | End: 2024-08-17

## 2024-08-17 RX ORDER — KETOROLAC TROMETHAMINE 30 MG/ML
INJECTION, SOLUTION INTRAMUSCULAR; INTRAVENOUS AS NEEDED
Status: DISCONTINUED | OUTPATIENT
Start: 2024-08-17 | End: 2024-08-17

## 2024-08-17 RX ORDER — KETOROLAC TROMETHAMINE 30 MG/ML
30 INJECTION, SOLUTION INTRAMUSCULAR; INTRAVENOUS EVERY 6 HOURS
Status: COMPLETED | OUTPATIENT
Start: 2024-08-17 | End: 2024-08-17

## 2024-08-17 RX ORDER — LIDOCAINE 560 MG/1
1 PATCH PERCUTANEOUS; TOPICAL; TRANSDERMAL
Status: DISCONTINUED | OUTPATIENT
Start: 2024-08-17 | End: 2024-08-20 | Stop reason: HOSPADM

## 2024-08-17 RX ORDER — DIPHENHYDRAMINE HCL 25 MG
25 CAPSULE ORAL EVERY 6 HOURS PRN
Status: DISCONTINUED | OUTPATIENT
Start: 2024-08-17 | End: 2024-08-17

## 2024-08-17 RX ORDER — NIFEDIPINE 10 MG/1
10 CAPSULE ORAL ONCE AS NEEDED
Status: DISCONTINUED | OUTPATIENT
Start: 2024-08-17 | End: 2024-08-20 | Stop reason: HOSPADM

## 2024-08-17 RX ORDER — HYDRALAZINE HYDROCHLORIDE 20 MG/ML
5 INJECTION INTRAMUSCULAR; INTRAVENOUS ONCE AS NEEDED
Status: DISCONTINUED | OUTPATIENT
Start: 2024-08-17 | End: 2024-08-20 | Stop reason: HOSPADM

## 2024-08-17 RX ORDER — TRANEXAMIC ACID 100 MG/ML
1000 INJECTION, SOLUTION INTRAVENOUS ONCE AS NEEDED
Status: DISCONTINUED | OUTPATIENT
Start: 2024-08-17 | End: 2024-08-20 | Stop reason: HOSPADM

## 2024-08-17 RX ORDER — ONDANSETRON HYDROCHLORIDE 2 MG/ML
4 INJECTION, SOLUTION INTRAVENOUS EVERY 6 HOURS PRN
Status: DISCONTINUED | OUTPATIENT
Start: 2024-08-17 | End: 2024-08-20 | Stop reason: HOSPADM

## 2024-08-17 RX ORDER — POLYETHYLENE GLYCOL 3350 17 G/17G
17 POWDER, FOR SOLUTION ORAL 2 TIMES DAILY PRN
Status: DISCONTINUED | OUTPATIENT
Start: 2024-08-17 | End: 2024-08-20 | Stop reason: HOSPADM

## 2024-08-17 RX ORDER — MISOPROSTOL 200 UG/1
800 TABLET ORAL ONCE AS NEEDED
Status: DISCONTINUED | OUTPATIENT
Start: 2024-08-17 | End: 2024-08-20 | Stop reason: HOSPADM

## 2024-08-17 RX ORDER — ONDANSETRON 4 MG/1
4 TABLET, FILM COATED ORAL EVERY 6 HOURS PRN
Status: DISCONTINUED | OUTPATIENT
Start: 2024-08-17 | End: 2024-08-20 | Stop reason: HOSPADM

## 2024-08-17 RX ORDER — OXYCODONE HYDROCHLORIDE 10 MG/1
10 TABLET ORAL EVERY 4 HOURS PRN
Status: DISCONTINUED | OUTPATIENT
Start: 2024-08-18 | End: 2024-08-20 | Stop reason: HOSPADM

## 2024-08-17 RX ORDER — MORPHINE SULFATE 0.5 MG/ML
INJECTION, SOLUTION EPIDURAL; INTRATHECAL; INTRAVENOUS AS NEEDED
Status: DISCONTINUED | OUTPATIENT
Start: 2024-08-17 | End: 2024-08-17

## 2024-08-17 RX ORDER — OXYTOCIN/0.9 % SODIUM CHLORIDE 30/500 ML
60 PLASTIC BAG, INJECTION (ML) INTRAVENOUS ONCE AS NEEDED
Status: DISCONTINUED | OUTPATIENT
Start: 2024-08-17 | End: 2024-08-20 | Stop reason: HOSPADM

## 2024-08-17 RX ORDER — METHYLERGONOVINE MALEATE 0.2 MG/ML
0.2 INJECTION INTRAVENOUS ONCE AS NEEDED
Status: DISCONTINUED | OUTPATIENT
Start: 2024-08-17 | End: 2024-08-20 | Stop reason: HOSPADM

## 2024-08-17 RX ORDER — IBUPROFEN 600 MG/1
600 TABLET ORAL EVERY 6 HOURS
Status: DISCONTINUED | OUTPATIENT
Start: 2024-08-18 | End: 2024-08-20 | Stop reason: HOSPADM

## 2024-08-17 RX ORDER — LABETALOL HYDROCHLORIDE 5 MG/ML
20 INJECTION, SOLUTION INTRAVENOUS ONCE AS NEEDED
Status: DISCONTINUED | OUTPATIENT
Start: 2024-08-17 | End: 2024-08-20 | Stop reason: HOSPADM

## 2024-08-17 RX ORDER — LOPERAMIDE HYDROCHLORIDE 2 MG/1
4 CAPSULE ORAL EVERY 2 HOUR PRN
Status: DISCONTINUED | OUTPATIENT
Start: 2024-08-17 | End: 2024-08-20 | Stop reason: HOSPADM

## 2024-08-17 RX ORDER — DIPHENHYDRAMINE HCL 25 MG
25 CAPSULE ORAL EVERY 4 HOURS PRN
Status: DISCONTINUED | OUTPATIENT
Start: 2024-08-17 | End: 2024-08-20 | Stop reason: HOSPADM

## 2024-08-17 RX ORDER — ENOXAPARIN SODIUM 100 MG/ML
60 INJECTION SUBCUTANEOUS EVERY 24 HOURS
Status: DISCONTINUED | OUTPATIENT
Start: 2024-08-18 | End: 2024-08-20 | Stop reason: HOSPADM

## 2024-08-17 RX ORDER — BISACODYL 10 MG/1
10 SUPPOSITORY RECTAL DAILY PRN
Status: DISCONTINUED | OUTPATIENT
Start: 2024-08-17 | End: 2024-08-20 | Stop reason: HOSPADM

## 2024-08-17 RX ORDER — SIMETHICONE 80 MG
80 TABLET,CHEWABLE ORAL 4 TIMES DAILY PRN
Status: DISCONTINUED | OUTPATIENT
Start: 2024-08-17 | End: 2024-08-20 | Stop reason: HOSPADM

## 2024-08-17 RX ORDER — OXYTOCIN 10 [USP'U]/ML
10 INJECTION, SOLUTION INTRAMUSCULAR; INTRAVENOUS ONCE AS NEEDED
Status: DISCONTINUED | OUTPATIENT
Start: 2024-08-17 | End: 2024-08-20 | Stop reason: HOSPADM

## 2024-08-17 RX ORDER — ACETAMINOPHEN 325 MG/1
975 TABLET ORAL EVERY 6 HOURS
Status: DISCONTINUED | OUTPATIENT
Start: 2024-08-17 | End: 2024-08-20 | Stop reason: HOSPADM

## 2024-08-17 RX ADMIN — PHENYLEPHRINE HYDROCHLORIDE 0.27 MCG/KG/MIN: 10 INJECTION INTRAVENOUS at 02:42

## 2024-08-17 RX ADMIN — MORPHINE SULFATE 3 MG: 0.5 INJECTION EPIDURAL; INTRATHECAL; INTRAVENOUS at 03:02

## 2024-08-17 RX ADMIN — AZITHROMYCIN 500 MG: 500 INJECTION, POWDER, LYOPHILIZED, FOR SOLUTION INTRAVENOUS at 03:54

## 2024-08-17 RX ADMIN — FENTANYL CITRATE 5 ML: 50 INJECTION INTRAMUSCULAR; INTRAVENOUS at 02:27

## 2024-08-17 RX ADMIN — CEFAZOLIN 3 G: 1 INJECTION, POWDER, FOR SOLUTION INTRAMUSCULAR; INTRAVENOUS at 03:54

## 2024-08-17 RX ADMIN — AZITHROMYCIN 500 MG: 500 INJECTION, POWDER, LYOPHILIZED, FOR SOLUTION INTRAVENOUS at 02:37

## 2024-08-17 RX ADMIN — FAMOTIDINE 20 MG: 10 INJECTION, SOLUTION INTRAVENOUS at 02:29

## 2024-08-17 RX ADMIN — FENTANYL CITRATE 5 ML: 50 INJECTION INTRAMUSCULAR; INTRAVENOUS at 02:29

## 2024-08-17 RX ADMIN — SODIUM CHLORIDE, SODIUM LACTATE, POTASSIUM CHLORIDE, AND CALCIUM CHLORIDE: 600; 310; 30; 20 INJECTION, SOLUTION INTRAVENOUS at 02:29

## 2024-08-17 RX ADMIN — OXYTOCIN 600 MILLI-UNITS/MIN: 10 INJECTION, SOLUTION INTRAMUSCULAR; INTRAVENOUS at 03:01

## 2024-08-17 RX ADMIN — CEFAZOLIN 3 G: 1 INJECTION, POWDER, FOR SOLUTION INTRAMUSCULAR; INTRAVENOUS at 02:37

## 2024-08-17 RX ADMIN — KETOROLAC TROMETHAMINE 15 MG: 30 INJECTION, SOLUTION INTRAMUSCULAR at 03:59

## 2024-08-17 RX ADMIN — ONDANSETRON 4 MG: 2 INJECTION INTRAMUSCULAR; INTRAVENOUS at 02:29

## 2024-08-17 ASSESSMENT — PAIN SCALES - GENERAL
PAINLEVEL_OUTOF10: 0 - NO PAIN
PAINLEVEL_OUTOF10: 0 - NO PAIN
PAINLEVEL_OUTOF10: 3
PAINLEVEL_OUTOF10: 2
PAINLEVEL_OUTOF10: 0 - NO PAIN
PAIN_LEVEL: 0
PAINLEVEL_OUTOF10: 0 - NO PAIN

## 2024-08-17 ASSESSMENT — PAIN DESCRIPTION - LOCATION: LOCATION: BACK

## 2024-08-17 NOTE — SIGNIFICANT EVENT
"Labor Progress Note    Subjective:   To room for Cat II tracing not responding to positional changes. Pt amenable to IUPC/FSE placement at this time.     Objective:  Vitals: /77   Pulse 77   Temp 36 °C (96.8 °F) (Temporal)   Resp 18   Ht 1.651 m (5' 5\")   Wt 124 kg (272 lb 4.3 oz)   SpO2 98%   BMI 45.31 kg/m²     SVE: 3/50/-3  FHT: 145/moderate/+accels/+late deccels   [place]: q 2 min    A/P:  Continue pitocin per protocol, currently at 12  FSE/IUPC placed  CEFM, currently Category II, will continue monitoring, changing position  Epidural infusing    Alida Pimentel MD, MPH  Obstetrics & Gynecology, PGY-1    "

## 2024-08-17 NOTE — L&D DELIVERY NOTE
OB Delivery Note  2024  Dot Niño  33 y.o.   , Low Transverse     Gestational Age: 40w2d  /Para:   Quantitative Blood Loss: Admission to Discharge: 1501 mL (2024 10:53 AM - 2024  6:29 AM)    Indication: Fetal intolerance of labor augmentation, persistent category 2 tracing    Pre op diagnosis:    1. IUP at 40w2d weeks     Post op diagnosis: Same    Findings: Normal appearing gravid uterus, fallopian tubes, and ovaries. Amniotic fluid clear, infant in cephalic presentation, adherent intrauterine clot concerning for placental abruption    Procedure: Pt was taken to the OR where regional anesthesia was found to be adequate. She was then placed in the dorsal supine position with a left lateral tilt. A priest catheter was placed, SCD’s were applied, and a vaginal prep was performed. A pre-procedure time out was performed. The pt was given prophylactic dose of IV antibiotics. She was then prepped and draped in the usual sterile fashion. A Pfannenstiel skin incision was made with the scalpel through the skin and subcutaneous fat to the underlying fascial layer. The fascia was incised on either side of the midline with the scalpel, and the incision was extended bilaterally using sharp dissection with curved Chaparro scissors with clear visualization inferiorly. The superior aspect of the incision was grasped, tented up with Kocher clamps and the rectus muscle was dissected off. The muscles were divided in the midline, the peritoneum was then identified and entered bluntly and incision extended superiorly and inferiorly taking care to avoid underlying viscera.      A low transverse uterine incision was made with the scalpel, the uterine cavity was entered, and the hysterotomy was extended with cephalocaudal stretch. The infant was delivered atraumatically, the cord was clamped and cut and infant was handed off to awaiting nursing. A cord segment and blood sample were collected. The  placenta was then expressed.     The uterus was exteriorized. While clearing of the contents of the uterus, an approximately 3x3cm, dark clot was noted in the uterus partially adherent to the uterine wall on the left side.The clot was removed with a dry lap sponge without additional bleeding from the attachment site. The remaining clots and debris were then removed from the uterus. Good hemostasis was noted within the uterus. The uterine incision was repaired using a running locked stitch of 0-Monocryl. Due to additional bleeding on the left aspect of the hysterotomy, a second layer of the same suture was placed in an imbricating fashion. Additional figure of eight sutures were placed and electrocautery was utilized on the serosal edges for hemostasis. Good hemostasis was noted.    The uterus was the placed back inside the pelvis, the gutters were cleared of all clots and debris. The hysterotomy was again evaluated and found to be hemostatic, the underside of the fascia and bladder and the rectus muscles were also found to be hemostatic. The fascia was closed using a running stitch of 0-Vicryl.  The subcutaneous layer was irrigated, small bleeders were cauterized, and the subcutaneous layer was reapproximated using a running stitch of 2-0 monocryl. The skin was closed with 4-0 monocryl. A second dose of antibiotics was given due to EBL (> 1500 mL). All counts were correct, the patient tolerated the procedure well.  Dr. Gonzalez was present for all key portions of the procedure. The patient and infant were taken back to the recovery room in stable condition.      Elías Niño [[id number]]      Labor Events    Rupture date/time: 8/17/2024 0038  Labor type: Induced Onset of Labor  Labor allowed to proceed with plans for an attempted vaginal birth?: Yes  Induction: Oxytocin, AROM       Labor Event Times    Labor onset date/time: 8/16/2024 1247       Placenta    Placenta delivery date/time: 8/17/2024 0302  Placenta  removal: Manual removal  Placenta appearance: Intact  Placenta disposition: lab       Cord    Vessels: 3 vessels  Complications: Nuchal  Nuchal intervention: reduced  Nuchal cord description: loose nuchal cord  Number of loops: 1  Delayed cord clamping?: Yes  Cord clamped date/time: 2024 03:01:00  Cord blood disposition: Lab  Gases sent?: Yes  Stem cell collection (by provider): No       Lacerations    Episiotomy: None  Perineal laceration: None  Other lacerations?: No  Repair suture: None       Anesthesia    Method: Epidural       Operative Delivery    Forceps attempted?: No  Vacuum extractor attempted?: No       Shoulder Dystocia    Shoulder dystocia present?: No       [place] Delivery    Time head delivered: 2024 03:00:00  Birth date/time: 2024 03:00:00  Delivery type: , Low Transverse   categorization: primary   priority: routine  Indications for : Persistent Category 2 Tracing Remote from Delivery       Resuscitation    Method: None       Apgars    Living status: Living  Apgar Component Scores:  1 min.:  5 min.:  10 min.:  15 min.:  20 min.:    Skin color:  1  1       Heart rate:  2  2       Reflex irritability:  2  2       Muscle tone:  2  2       Respiratory effort:  2  2       Total:  9  9       Apgars assigned by: KANDI SOLITARIO RN       Delivery Providers    Delivering clinician: Eugene Gonzalez MD   Provider Role     Delivery Nurse     Nursery Nurse    Alida Pimentel MD Resident    MD Alida Onofre MD

## 2024-08-17 NOTE — ANESTHESIA POSTPROCEDURE EVALUATION
Patient: Dot Niño    Procedure Summary       Date: 24 Room / Location: [facility] 2 OB    Anesthesia Start:  Anesthesia Stop: 24    Procedure: OBGYN Delivery  Section (Abdomen) Diagnosis: (Fetal Intolerance of Labor)    Surgeons: Eugene Gonzalez MD Responsible Provider: Gaurav Soliman MD    Anesthesia Type: epidural ASA Status: 2            Anesthesia Type: epidural    Vitals Value Taken Time   /56 24   Temp 36.5 24   Pulse 78 24   Resp 16 24   SpO2 100 24       Anesthesia Post Evaluation    Patient location during evaluation: floor  Patient participation: complete - patient participated  Level of consciousness: awake and alert  Pain score: 0  Pain management: adequate  Airway patency: patent  Cardiovascular status: stable  Respiratory status: spontaneous ventilation  Hydration status: acceptable  Postoperative Nausea and Vomiting: none        No notable events documented.

## 2024-08-17 NOTE — SIGNIFICANT EVENT
Decision for  Section    Patient having recurrent late decelerations with pitocin off for more than 30 minutes despite fluid bolus, position changes, IUPC with amnio infusion and FSE in place. Discussed with patient concern for placental insufficiency and inability to augment at 4cm with concern for fetal distress. Discussed with patient recommendation for  delivery for these concerns and implications on future pregnancies. Patient expressed understanding and desires to move forward with  delivery for NRFHT at 4cm. Non scheduled, non urgent, charge RN and Anesthesia aware.     Elina Yao MD  Discussed with Dr. Gonzalez

## 2024-08-17 NOTE — ANESTHESIA PROCEDURE NOTES
Epidural Block    Patient location during procedure: OB  Start time: 8/16/2024 11:26 PM  End time: 8/16/2024 11:48 PM  Reason for block: labor analgesia  Staffing  Performed: resident   Authorized by: Humberto Momin DO    Performed by: Humberto Momin DO    Preanesthetic Checklist  Completed: patient identified, IV checked, risks and benefits discussed, surgical consent, pre-op evaluation, timeout performed and sterile techniques followed  Block Timeout  RN/Licensed healthcare professional reads aloud to the Anesthesia provider and entire team: Patient identity, procedure with side and site, patient position, and as applicable the availability of implants/special equipment/special requirements.  Patient on coagulant treatment: no  Timeout performed at:   Block Placement  Patient position: sitting  Prep: ChloraPrep  Sterility prep: cap, drape, gloves and mask  Sedation level: no sedation  Patient monitoring: blood pressure, continuous pulse oximetry and heart rate  Approach: midline  Local numbing: lidocaine 1% to skin and subcutaneous tissues  Vertebral space: lumbar  Lumbar location: L3-L4  Epidural  Loss of resistance technique: saline  Guidance: landmark technique        Needle  Needle type: Tuohy   Needle gauge: 17  Needle length: 8.9cm  Needle insertion depth: 9 cm  Catheter size: 19 G  Catheter at skin depth: 14 cm  Catheter securement method: clear occlusive dressing and liquid medical adhesive    Test dose: lidocaine 1.5% with epinephrine 1-to-200,000  Test dose: lidocaine 1.5% with epinephrine 1-to-200,000  Test dose result: no positive test dose    PCEA  Medication concentration used: 0.044% Bupivacaine with 1.25 mcg/mL Fentanyl and [id number] Epinephrine  Dose (mL): 10  Lockout (minutes): 15  1-Hour Limit (boluses/hr): 4  Basal Rate: 14        Assessment  Sensory level: T10 bilateral  Block outcome: patient comfortable  Number of attempts: 1  Events: no positive test dose  Procedure assessment: patient  tolerated procedure well with no immediate complications

## 2024-08-17 NOTE — SIGNIFICANT EVENT
"Labor Progress Note    Subjective: Dot is feeling good at this time, better with epidural placement. Amenable to AROM.    Objective:  Vitals: /67   Pulse 90   Temp (!) 35.8 °C (96.4 °F) (Temporal)   Resp 18   Ht 1.651 m (5' 5\")   Wt 124 kg (272 lb 4.3 oz)   SpO2 100%   BMI 45.31 kg/m²     SVE: 3/50/-3  FHT: 150/moderate/+accels/+deccels, responding to positional changes  [place]: q 5 min    A/P:  Continue pitocin per protocol  AROM performed for scant blood-tinged fluid  CEFM, currently Category I  Epidural infusing    Alida Pimentel MD  OB/GYN   "

## 2024-08-17 NOTE — ANESTHESIA PREPROCEDURE EVALUATION
Patient: Dot Niño    Evaluation Method: In-person visit    Procedure Information    Date: 08/16/24  Procedure: Labor Consult         Relevant Problems   Pulmonary   (+) Moderate persistent asthma with acute exacerbation ([facility])       Clinical information reviewed:   Tobacco  Allergies  Meds   Med Hx  Surg Hx   Fam Hx          NPO Detail:  No data recorded     OB/Gyn Evaluation    Present Pregnancy    Patient is pregnant now.   Obstetric History                Physical Exam    Airway  Mallampati: III  TM distance: <3 FB     Cardiovascular - normal exam     Dental    Pulmonary   (+) wheezes     Abdominal            Anesthesia Plan    History of general anesthesia?: no  History of complications of general anesthesia?: no    ASA 2     epidural     The patient is not a current smoker.  Patient was previously instructed to abstain from smoking on day of procedure.  Patient did not smoke on day of procedure.    Anesthetic plan and risks discussed with patient.  Use of blood products discussed with patient who.    Plan discussed with attending.

## 2024-08-17 NOTE — CARE PLAN
Problem:  Recovery Care  Goal: Verbalizes understanding of post-op instructions  Outcome: Progressing  Goal: Manages discomfort  Outcome: Progressing  Goal: Dressing intact until removed with any drainage marked  Outcome: Progressing  Goal: Patient vital signs are stable  Outcome: Progressing  Goal: Urine output is 0.5 mL/kg/hr or more  Outcome: Progressing  Goal: Fundus firm at midline  Outcome: Progressing     The clinical goals for the shift include VSS, bleeding and swelling minimal, and pain controlled with medications.

## 2024-08-17 NOTE — SIGNIFICANT EVENT
LABOR PROGRESS NOTE  SUBJECTIVE  Patient doing well with epidural and pitocin infusing. Feeling intermittent contractions. Called to bedside due to recurrent variable decelerations    OBJECTIVE  Visit Vitals  /77   Pulse 85   Temp 36 °C (96.8 °F) (Temporal)   Resp 18        Cervical Exam  Dilation: 4  Effacement (%): 50  Fetal Station: -3  Method: Manual  OB Examiner: MD Najma  Fetal Assessment  Movement: Present  Mode: External US  Baseline Fetal Heart Rate (bpm): 145 bpm  Baseline Classification: Normal  Variability: Moderate (Between 6 and 25 BPM)  Pattern: Accelerations with variable decelerations  Pattern Observations: difficulty tracing FHR d/t patient repositioning. RN to readjust EFM  FHR Category: Category I   Fetal Decelerations: No  Contraction Frequency: 2-3    A&P    IOL  - Continue to monitor for cervical change  - discontinue pitocin, will start amnioinfusion    Elina Yao MD

## 2024-08-18 VITALS
DIASTOLIC BLOOD PRESSURE: 86 MMHG | RESPIRATION RATE: 18 BRPM | BODY MASS INDEX: 45.36 KG/M2 | WEIGHT: 272.27 LBS | OXYGEN SATURATION: 96 % | HEIGHT: 65 IN | HEART RATE: 94 BPM | TEMPERATURE: 97.9 F | SYSTOLIC BLOOD PRESSURE: 140 MMHG

## 2024-08-18 PROBLEM — D62 ABLA (ACUTE BLOOD LOSS ANEMIA): Status: ACTIVE | Noted: 2024-08-18

## 2024-08-18 LAB
ALBUMIN SERPL BCP-MCNC: 3.1 G/DL (ref 3.4–5)
ALP SERPL-CCNC: 126 U/L (ref 33–110)
ALT SERPL W P-5'-P-CCNC: 8 U/L (ref 7–45)
ANION GAP SERPL CALC-SCNC: 12 MMOL/L (ref 10–20)
AST SERPL W P-5'-P-CCNC: 14 U/L (ref 9–39)
BILIRUB SERPL-MCNC: 0.3 MG/DL (ref 0–1.2)
BUN SERPL-MCNC: 5 MG/DL (ref 6–23)
CALCIUM SERPL-MCNC: 8.6 MG/DL (ref 8.6–10.6)
CHLORIDE SERPL-SCNC: 108 MMOL/L (ref 98–107)
CO2 SERPL-SCNC: 22 MMOL/L (ref 21–32)
CREAT SERPL-MCNC: 0.52 MG/DL (ref 0.5–1.05)
EGFRCR SERPLBLD CKD-EPI 2021: >90 ML/MIN/1.73M*2
ERYTHROCYTE [DISTWIDTH] IN BLOOD BY AUTOMATED COUNT: 13.2 % (ref 11.5–14.5)
GLUCOSE SERPL-MCNC: 71 MG/DL (ref 74–99)
HCT VFR BLD AUTO: 22.3 % (ref 36–46)
HGB BLD-MCNC: 7.2 G/DL (ref 12–16)
MCH RBC QN AUTO: 26.8 PG (ref 26–34)
MCHC RBC AUTO-ENTMCNC: 32.3 G/DL (ref 32–36)
MCV RBC AUTO: 83 FL (ref 80–100)
NRBC BLD-RTO: 0 /100 WBCS (ref 0–0)
PLATELET # BLD AUTO: 197 X10*3/UL (ref 150–450)
POTASSIUM SERPL-SCNC: 3.7 MMOL/L (ref 3.5–5.3)
PROT SERPL-MCNC: 5.3 G/DL (ref 6.4–8.2)
RBC # BLD AUTO: 2.69 X10*6/UL (ref 4–5.2)
SODIUM SERPL-SCNC: 138 MMOL/L (ref 136–145)
WBC # BLD AUTO: 5.6 X10*3/UL (ref 4.4–11.3)

## 2024-08-18 PROCEDURE — 84075 ASSAY ALKALINE PHOSPHATASE: CPT

## 2024-08-18 PROCEDURE — 2500000001 HC RX 250 WO HCPCS SELF ADMINISTERED DRUGS (ALT 637 FOR MEDICARE OP)

## 2024-08-18 PROCEDURE — 2500000004 HC RX 250 GENERAL PHARMACY W/ HCPCS (ALT 636 FOR OP/ED): Mod: JZ

## 2024-08-18 PROCEDURE — 2500000002 HC RX 250 W HCPCS SELF ADMINISTERED DRUGS (ALT 637 FOR MEDICARE OP, ALT 636 FOR OP/ED)

## 2024-08-18 PROCEDURE — 2500000004 HC RX 250 GENERAL PHARMACY W/ HCPCS (ALT 636 FOR OP/ED)

## 2024-08-18 PROCEDURE — 36415 COLL VENOUS BLD VENIPUNCTURE: CPT

## 2024-08-18 PROCEDURE — 85027 COMPLETE CBC AUTOMATED: CPT

## 2024-08-18 PROCEDURE — 1100000001 HC PRIVATE ROOM DAILY

## 2024-08-18 PROCEDURE — 1120000001 HC OB PRIVATE ROOM DAILY

## 2024-08-18 RX ORDER — FAMOTIDINE 10 MG/ML
20 INJECTION INTRAVENOUS ONCE AS NEEDED
Status: DISCONTINUED | OUTPATIENT
Start: 2024-08-18 | End: 2024-08-20 | Stop reason: HOSPADM

## 2024-08-18 RX ORDER — ALBUTEROL SULFATE 0.83 MG/ML
SOLUTION RESPIRATORY (INHALATION)
Status: DISCONTINUED
Start: 2024-08-18 | End: 2024-08-18 | Stop reason: WASHOUT

## 2024-08-18 RX ORDER — DIPHENHYDRAMINE HYDROCHLORIDE 50 MG/ML
50 INJECTION INTRAMUSCULAR; INTRAVENOUS EVERY 5 MIN PRN
Status: DISCONTINUED | OUTPATIENT
Start: 2024-08-18 | End: 2024-08-20 | Stop reason: HOSPADM

## 2024-08-18 RX ORDER — DIPHENHYDRAMINE HYDROCHLORIDE 50 MG/ML
INJECTION INTRAMUSCULAR; INTRAVENOUS
Status: DISCONTINUED
Start: 2024-08-18 | End: 2024-08-18 | Stop reason: WASHOUT

## 2024-08-18 RX ORDER — EPINEPHRINE 0.3 MG/.3ML
0.3 INJECTION SUBCUTANEOUS EVERY 5 MIN PRN
Status: DISCONTINUED | OUTPATIENT
Start: 2024-08-18 | End: 2024-08-20 | Stop reason: HOSPADM

## 2024-08-18 RX ORDER — SODIUM CHLORIDE 9 MG/ML
25 INJECTION, SOLUTION INTRAVENOUS CONTINUOUS
Status: DISCONTINUED | OUTPATIENT
Start: 2024-08-18 | End: 2024-08-20 | Stop reason: HOSPADM

## 2024-08-18 ASSESSMENT — PAIN SCALES - GENERAL
PAINLEVEL_OUTOF10: 0 - NO PAIN
PAINLEVEL_OUTOF10: 2

## 2024-08-18 ASSESSMENT — PAIN DESCRIPTION - LOCATION: LOCATION: ABDOMEN

## 2024-08-18 NOTE — ANESTHESIA POSTPROCEDURE EVALUATION
Patient: Dot Niño    Procedure Summary       Date: 24 Room / Location: [facility] OB    Anesthesia Start:  Anesthesia Stop: 24    Procedure: OBGYN Delivery  Section (Abdomen) Diagnosis: (Fetal Intolerance of Labor)    Surgeons: Eugene Gonzalez MD Responsible Provider: Gaurav Soliman MD    Anesthesia Type: epidural ASA Status: 2            Anesthesia Type: epidural    Vitals Value Taken Time   /72 2415   Temp 36.2 °C (97.2 °F) 2415   Pulse 70 2415   Resp 18 2415   SpO2 98 % 24       Anesthesia Post Evaluation    Patient location during evaluation: bedside  Patient participation: complete - patient participated  Level of consciousness: awake  Pain management: adequate  Airway patency: patent  Cardiovascular status: acceptable  Respiratory status: acceptable  Hydration status: acceptable  Postoperative Nausea and Vomiting: none  Comments: Dot Niño is a 33 y.o., , who had a , Low Transverse delivery on 2024 at 40w3d and is now POD.    She had Neuraxial Anesthesia without immediate complications noted.       Pain well controlled    ---------------------------               24         ---------------------------   BP:          122/81         Pulse:         92           Resp:          18           Temp:   36.6 °C (97.9 °F)   SpO2:                      ---------------------------    Neuraxial site assessed. No visible redness or swelling or drainage. Patient able to ambulate and move all extremities without difficulty. Able to void. No complaints of nausea/vomiting. Tolerating PO intake well. No s/sx of PDPH.     Anesthesia will sign off     Shaun Lim MD          No notable events documented.

## 2024-08-18 NOTE — PROGRESS NOTES
Postpartum Progress Note    Assessment/Plan   Dot Niño is a 33 y.o., , who delivered at 40w3d gestation    Now PPD# s/p , Low Transverse on 2024  - continue routine postpartum care  - pain well controlled on po medications  - dvt risk score DVT Score: 5 , ppx with lovenox     ABLA  - EBL 1.5 L  - fatigue but denies dizzy/LH   - IV dextran ordered for today   - Hgb:   Results from last 7 days   Lab Units 24  0501 24  0618 24  1450   HEMOGLOBIN g/dL 7.2* 8.1* 10.7*      Maternal Well-Being  - emotional support provided  - MMR vaccine to be given prior to dc      Feeding  - breastfeeding/pumping encouraged; lactation consult prn    Contraception  - education provided  - depo    Dispo  - anticipate d/c on PPD #3 if meeting all postpartum milestones  Follow up in 2 wks for incision check with your OB provider. and Follow up in 4-6 wk for postpartum visit with your OB provider.     Alida Patel PA-C  Postpartum Pager [phone]    Active Problems:    Moderate persistent asthma with acute exacerbation ([facility])     delivery delivered ([facility])    Pregnancy Problems (from 24 to present)       Problem Noted Resolved    Labor and delivery indication for care or intervention ([facility]) 2024 by Vivi Funez MD 2024 by Alida Patel PA-C    Priority:  Medium            Hospital course:  PPH, ABLA   section delivery  Patient is not breastfeedingThe patient's blood type is O POS. The baby's blood type is O POS. Rhogam is not indicated.    Subjective   Her pain is well controlled with current medications  She is passing flatus  She is ambulating well  She is tolerating a Adult diet Regular  She reports no breast or nursing problems  She denies emotional concerns today   Her plan for contraception is Depo Provera     Pt seen at the bedside in NAD. Doing well, minimal pain. Wants to shower this morning. Feeling fatigued but denies  dizziness/lightheadedness/LOC/uncontrolled bleeding. Denies CP, SOB, calf pain, fever, passage of large clots.     Objective   Allergies:   Patient has no known allergies.         Last Vitals:  Temp Pulse Resp BP MAP Pulse Ox   36.6 °C (97.9 °F) 94 18 122/81   99 %     Vitals Min/Max Last 24 Hours:  Temp  Min: 36 °C (96.8 °F)  Max: 37 °C (98.6 °F)  Pulse  Min: 77  Max: 103  Resp  Min: 16  Max: 20  BP  Min: 99/66  Max: 130/76    Intake/Output:     Intake/Output Summary (Last 24 hours) at 8/18/2024 1059  Last data filed at 8/18/2024 0300  Gross per 24 hour   Intake --   Output 1300 ml   Net -1300 ml       Physical Exam:  General: Examination reveals a well developed, well nourished, female, in no acute distress. She is alert and cooperative.  HEENT: External ears normal. Nose normal, no erythema or discharge.  Neck: supple, no significant adenopathy.  Lungs: breathing even and unlabored   Cardiac: warm and well perfused .  Fundus: firm and below umbilicus. Lochia light  Extremities: no redness or tenderness in the calves or thighs, no edema.  Neurological: alert, oriented, normal speech, no focal findings or movement disorder noted.  Psychological: awake and alert; oriented to person, place, and time.  Skin: no rashes or lesions, dressing in place, no shadowing or drainage     Lab Data:  Labs in chart were reviewed.

## 2024-08-19 LAB
BB ANTIBODY IDENTIFICATION: NORMAL
BLOOD EXPIRATION DATE: NORMAL
CASE #: NORMAL
DISPENSE STATUS: NORMAL
PRODUCT BLOOD TYPE: NORMAL
PRODUCT CODE: NORMAL
UNIT ABO: NORMAL
UNIT NUMBER: NORMAL
UNIT RH: NORMAL
UNIT VOLUME: 280
XM INTEP: NORMAL

## 2024-08-19 PROCEDURE — 2500000004 HC RX 250 GENERAL PHARMACY W/ HCPCS (ALT 636 FOR OP/ED): Performed by: NURSE PRACTITIONER

## 2024-08-19 PROCEDURE — 2500000001 HC RX 250 WO HCPCS SELF ADMINISTERED DRUGS (ALT 637 FOR MEDICARE OP)

## 2024-08-19 PROCEDURE — 2500000002 HC RX 250 W HCPCS SELF ADMINISTERED DRUGS (ALT 637 FOR MEDICARE OP, ALT 636 FOR OP/ED): Performed by: NURSE PRACTITIONER

## 2024-08-19 PROCEDURE — 1100000001 HC PRIVATE ROOM DAILY

## 2024-08-19 RX ORDER — MEDROXYPROGESTERONE ACETATE 150 MG/ML
150 INJECTION, SUSPENSION INTRAMUSCULAR ONCE
Status: COMPLETED | OUTPATIENT
Start: 2024-08-19 | End: 2024-08-19

## 2024-08-19 RX ORDER — NIFEDIPINE 30 MG/1
30 TABLET, FILM COATED, EXTENDED RELEASE ORAL DAILY
Status: DISCONTINUED | OUTPATIENT
Start: 2024-08-19 | End: 2024-08-20

## 2024-08-19 ASSESSMENT — PAIN SCALES - GENERAL
PAINLEVEL_OUTOF10: 1
PAINLEVEL_OUTOF10: 0 - NO PAIN

## 2024-08-19 NOTE — PROGRESS NOTES
Postpartum Progress Note    Assessment/Plan   Dot Niño is a 33 y.o., , who delivered at 40w4d gestation    Now POD#2 s/p , Low Transverse on 2024  - Continue routine postpartum care  - Pain well controlled on po medications  - DVT risk score DVT Score: 5 , ppx with SCDs, ambulation, and lovenox  - Hgb:   Results from last 7 days   Lab Units 24  0501 24  0618 24  1450   HEMOGLOBIN g/dL 7.2* 8.1* 10.7*      - RH: Rhogam not indicated Type/Shanita  Results from last 7 days   Lab Units 24  1450   ABO GROUPING  O   RH TYPE  POS     gHTN  - dx   - no meds  - HELLP labs neg x 1  - asymptomatic  - given BP cuff for home  - discussed s/s PEC, BP parameters, 3 day stay    ABLA  - EBL 1.5 L  - fatigue but denies dizzy/LH   - s/p IV dextran  - agreeable to PO iron at discharge    [place] Equivocal  - will accept MMR prior to discharge    Maternal Well-Being  - Vitals stable  - All questions and concerns address    [place] Feeding  - Breastfeeding/pumping encouraged  - Lactation consult prn    Contraception  - Depo Provera, order in  - Education provided    Dispo  - Anticipate d/c on POD #3 if meeting all post op and postpartum milestones  - Follow-up in 2-5 days for BP check  - Follow-up in 1-2wks for incision check  - Follow-up in 4-6wks with primary DUONG North, APRN-CNP     Active Problems:    Moderate persistent asthma with acute exacerbation ([facility])     delivery delivered ([facility])    ABLA (acute blood loss anemia)    Pregnancy Problems (from 24 to present)       Problem Noted Resolved    Labor and delivery indication for care or intervention ([facility]) 2024 by Vivi Funez MD 2024 by JOSSELIN JohnsonC          Hospital course: gestational hypertension,  delivery    Subjective      Dot Niño is POD#2 s/p  who reports feeling overall well.  No acute events overnight.  Voiding spontaneously, passing flatus.   Pain well controlled on PO meds.  Light lochia. Tolerating diet.  Denies HA, N/V, RUQ pain, vision changes, chest pain, or SOB. and Denies dizziness/lightheadedness/LOC/uncontrolled bleeding.    Objective   Allergies:   Patient has no known allergies.         Last Vitals:  Temp Pulse Resp BP MAP Pulse Ox   36.6 °C (97.9 °F) 86 18 128/85   97 %     Vitals Min/Max Last 24 Hours:  Temp  Min: 36.3 °C (97.3 °F)  Max: 36.8 °C (98.2 °F)  Pulse  Min: 72  Max: 98  Resp  Min: 16  Max: 22  BP  Min: 128/85  Max: 155/98    Intake/Output:   No intake or output data in the 24 hours ending 08/19/24 1049    Physical Exam:  General: examination reveals a well developed, well nourished, female, in no acute distress. She is alert and cooperative.  HEENT: external ears normal. Nose normal, no erythema or discharge.  Neck: supple, no significant adenopathy  Lungs: breathing even and unlabored, lungs clear all fields  Cardiac: warm and well perfused, heart rate regular rate and rhythm, no murmur  Fundus: firm and below umbilicus, lochia light  Abdominal: soft, appropriately tender, non-distended, bowel sounds active, + flatus, + BM  Extremities: no redness or tenderness in the calves or thighs, +1 pitting edema BLE.  Neurological: alert, oriented, normal speech, no focal findings or movement disorder noted.  Psychological: awake and alert; oriented to person, place, and time.  Skin: incision clean, dry, intact, well approximated, no redness, drainage, or warmth     Lab Data:    Lab Results   Component Value Date    GLUCOSE 71 (L) 08/18/2024     08/18/2024    K 3.7 08/18/2024     (H) 08/18/2024    CO2 22 08/18/2024    ANIONGAP 12 08/18/2024    BUN 5 (L) 08/18/2024    CREATININE 0.52 08/18/2024    EGFR >90 08/18/2024    CALCIUM 8.6 08/18/2024    ALBUMIN 3.1 (L) 08/18/2024    PROT 5.3 (L) 08/18/2024    ALKPHOS 126 (H) 08/18/2024    ALT 8 08/18/2024    AST 14 08/18/2024    BILITOT 0.3 08/18/2024

## 2024-08-19 NOTE — SIGNIFICANT EVENT
Mild range BP x 2 for last two readings.  Pt asymptomatic.  Started nifed 30 daily, first dose now.    Jannet North, APRN-CNP

## 2024-08-19 NOTE — SIGNIFICANT EVENT
BP Cuff and Home Monitoring  Patient meets criteria for home monitoring of blood pressure post discharge.  Reason:  current gestational hypertension. Met with patient to assess for availability of home BP monitor.   Patient does not have access to BP monitor at home.  Pt agreed to order home BP monitor from [employer].   X- Large BP monitor delivered to room.. Patient educated on importance of continuing to monitor BP at home, recording BP on home monitoring log and s/sx of when to call her provider.  Pt verbalized understanding the above information.  .c

## 2024-08-20 VITALS
HEIGHT: 65 IN | WEIGHT: 272.27 LBS | DIASTOLIC BLOOD PRESSURE: 88 MMHG | SYSTOLIC BLOOD PRESSURE: 131 MMHG | OXYGEN SATURATION: 95 % | HEART RATE: 102 BPM | RESPIRATION RATE: 20 BRPM | BODY MASS INDEX: 45.36 KG/M2 | TEMPERATURE: 98.4 F

## 2024-08-20 LAB
ERYTHROCYTE [DISTWIDTH] IN BLOOD BY AUTOMATED COUNT: 13.2 % (ref 11.5–14.5)
HCT VFR BLD AUTO: 26.3 % (ref 36–46)
HGB BLD-MCNC: 8.8 G/DL (ref 12–16)
MCH RBC QN AUTO: 26.8 PG (ref 26–34)
MCHC RBC AUTO-ENTMCNC: 33.5 G/DL (ref 32–36)
MCV RBC AUTO: 80 FL (ref 80–100)
NRBC BLD-RTO: 0 /100 WBCS (ref 0–0)
PATH REV-IMMUNOHEMATOLOGY-PR30: NORMAL
PLATELET # BLD AUTO: 281 X10*3/UL (ref 150–450)
RBC # BLD AUTO: 3.28 X10*6/UL (ref 4–5.2)
WBC # BLD AUTO: 4.5 X10*3/UL (ref 4.4–11.3)

## 2024-08-20 PROCEDURE — 2500000004 HC RX 250 GENERAL PHARMACY W/ HCPCS (ALT 636 FOR OP/ED)

## 2024-08-20 PROCEDURE — 2500000002 HC RX 250 W HCPCS SELF ADMINISTERED DRUGS (ALT 637 FOR MEDICARE OP, ALT 636 FOR OP/ED)

## 2024-08-20 PROCEDURE — 36415 COLL VENOUS BLD VENIPUNCTURE: CPT | Performed by: NURSE PRACTITIONER

## 2024-08-20 PROCEDURE — 85027 COMPLETE CBC AUTOMATED: CPT | Performed by: NURSE PRACTITIONER

## 2024-08-20 PROCEDURE — 99238 HOSP IP/OBS DSCHRG MGMT 30/<: CPT | Performed by: NURSE PRACTITIONER

## 2024-08-20 PROCEDURE — 2500000001 HC RX 250 WO HCPCS SELF ADMINISTERED DRUGS (ALT 637 FOR MEDICARE OP)

## 2024-08-20 RX ORDER — ACETAMINOPHEN 325 MG/1
975 TABLET ORAL EVERY 6 HOURS
Qty: 90 TABLET | Refills: 0 | Status: SHIPPED | OUTPATIENT
Start: 2024-08-20

## 2024-08-20 RX ORDER — NIFEDIPINE 60 MG/1
60 TABLET, FILM COATED, EXTENDED RELEASE ORAL DAILY
Status: DISCONTINUED | OUTPATIENT
Start: 2024-08-20 | End: 2024-08-20 | Stop reason: HOSPADM

## 2024-08-20 RX ORDER — NIFEDIPINE 60 MG/1
60 TABLET, FILM COATED, EXTENDED RELEASE ORAL DAILY
Qty: 30 TABLET | Refills: 3 | Status: SHIPPED | OUTPATIENT
Start: 2024-08-21

## 2024-08-20 RX ORDER — NIFEDIPINE 30 MG/1
30 TABLET, FILM COATED, EXTENDED RELEASE ORAL ONCE
Status: COMPLETED | OUTPATIENT
Start: 2024-08-20 | End: 2024-08-20

## 2024-08-20 RX ORDER — IBUPROFEN 600 MG/1
600 TABLET ORAL EVERY 6 HOURS
Qty: 30 TABLET | Refills: 0 | Status: SHIPPED | OUTPATIENT
Start: 2024-08-20

## 2024-08-20 RX ORDER — FERROUS GLUCONATE 324(38)MG
38 TABLET ORAL
Qty: 30 TABLET | Refills: 2 | Status: SHIPPED | OUTPATIENT
Start: 2024-08-20

## 2024-08-20 RX ORDER — OXYCODONE HYDROCHLORIDE 5 MG/1
5 TABLET ORAL EVERY 4 HOURS PRN
Qty: 8 TABLET | Refills: 0 | Status: SHIPPED | OUTPATIENT
Start: 2024-08-20

## 2024-08-20 ASSESSMENT — PAIN SCALES - GENERAL
PAINLEVEL_OUTOF10: 0 - NO PAIN

## 2024-08-20 NOTE — CARE PLAN
The patient's goals for the shift include Shower    The clinical goals for the shift include healthy mom and healthy baby    Problem:  Recovery Care  Goal: Verbalizes understanding of post-op instructions  Outcome: Met     Problem: Postpartum  Goal: Experiences normal postpartum course  Outcome: Met

## 2024-08-20 NOTE — DISCHARGE SUMMARY
Discharge Summary    Admission Date: 2024  Discharge Date: 24      Discharge Diagnosis   delivery delivered ([facility])    Hospital Course  Delivery Date: 2024 3:00 AM  Delivery type: , Low Transverse   GA at delivery: 40w5d  Outcome: Living  Anesthesia during delivery: Epidural  Intrapartum complications:    Feeding method: Breastfeeding Status: No     Procedures: none  Contraception at discharge: Depo Provera  Denies any sPEC s/sx.  S/p IV iron, hgb now 8.8.  asx anemia, PO iron for home.  Reviewed s/sx of spec to return prn.  Pt. Would like discharge as peds discharging baby.        Pertinent Physical Exam At Time of Discharge  General: Examination reveals a well developed, well nourished, female, in no acute distress. She is alert and cooperative.  Lungs: symmetrical, non-labored breathing.  Cardiac: warm, well-perfused.  Abdomen: soft, non-tender.  Fundus: firm, at umbilicus, and nontender.  Extremities: no redness or tenderness in the calves or thighs.  Neurological: alert, oriented, normal speech, no focal findings or movement disorder noted.     Last Vitals:  Temp Pulse Resp BP MAP Pulse Ox   36.9 °C (98.4 °F) 102 20 131/88 102 95 %     Discharge Meds     Your medication list        START taking these medications        Instructions Last Dose Given Next Dose Due   acetaminophen 325 mg tablet  Commonly known as: Tylenol      Take 3 tablets (975 mg) by mouth every 6 hours.       ferrous gluconate 324 (38 Fe) mg tablet  Commonly known as: Fergon      Take 1 tablet (38 mg of iron) by mouth once daily with breakfast.       ibuprofen 600 mg tablet      Take 1 tablet (600 mg) by mouth every 6 hours.       NIFEdipine ER 60 mg 24 hr tablet  Commonly known as: Adalat CC  Start taking on: 2024      Take 1 tablet (60 mg) by mouth once daily. Do not crush, chew, or split.       oxyCODONE 5 mg immediate release tablet  Commonly known as: Roxicodone      Take 1 tablet (5 mg) by  mouth every 4 hours if needed (Pain score 6-8).              CONTINUE taking these medications        Instructions Last Dose Given Next Dose Due   budesonide-formoteroL 160-4.5 mcg/actuation inhaler  Commonly known as: Symbicort                  STOP taking these medications      prenatal vit-iron fum-folic ac 28 mg iron- 800 mcg tablet                  Where to Get Your Medications        These medications were sent to [facility]/pharmacy #[id number] - [place], [place] - [address]  [id number] [address] [address] [facility] [address]      Hours: 24-hours Phone: [phone]   acetaminophen 325 mg tablet  ferrous gluconate 324 (38 Fe) mg tablet  ibuprofen 600 mg tablet  NIFEdipine ER 60 mg 24 hr tablet  oxyCODONE 5 mg immediate release tablet          Complications Requiring Follow-Up  Heavy vaginal bleeding, passing clots, fever and/or chills      Test Results Pending At Discharge  Pending Labs       Order Current Status    Surgical Pathology Exam - [facility] In process            Outpatient Follow-Up  No future appointments.    I spent 20 minutes in the professional and overall care of this patient.      Phoebe Mckeon, APRN-CNP

## 2024-08-22 LAB
LABORATORY COMMENT REPORT: NORMAL
PATH REPORT.FINAL DX SPEC: NORMAL
PATH REPORT.GROSS SPEC: NORMAL
PATH REPORT.RELEVANT HX SPEC: NORMAL
PATH REPORT.TOTAL CANCER: NORMAL

## 2024-10-03 ENCOUNTER — APPOINTMENT (OUTPATIENT)
Dept: OBSTETRICS AND GYNECOLOGY | Facility: HOSPITAL | Age: 34
End: 2024-10-03
Payer: COMMERCIAL

## 2024-10-08 ENCOUNTER — APPOINTMENT (OUTPATIENT)
Dept: OBSTETRICS AND GYNECOLOGY | Facility: HOSPITAL | Age: 34
End: 2024-10-08
Payer: COMMERCIAL

## 2024-11-07 ENCOUNTER — APPOINTMENT (OUTPATIENT)
Dept: OBSTETRICS AND GYNECOLOGY | Facility: HOSPITAL | Age: 34
End: 2024-11-07
Payer: COMMERCIAL

## 2024-11-07 ENCOUNTER — CLINICAL SUPPORT (OUTPATIENT)
Dept: OBSTETRICS AND GYNECOLOGY | Facility: HOSPITAL | Age: 34
End: 2024-11-07
Payer: COMMERCIAL

## 2024-11-07 VITALS — BODY MASS INDEX: 43.77 KG/M2 | WEIGHT: 263 LBS

## 2024-11-07 DIAGNOSIS — Z30.42 ENCOUNTER FOR DEPO-PROVERA CONTRACEPTION: Primary | ICD-10-CM

## 2024-11-07 PROCEDURE — 96372 THER/PROPH/DIAG INJ SC/IM: CPT | Performed by: OBSTETRICS & GYNECOLOGY

## 2024-11-07 PROCEDURE — 2500000004 HC RX 250 GENERAL PHARMACY W/ HCPCS (ALT 636 FOR OP/ED): Mod: SE | Performed by: OBSTETRICS & GYNECOLOGY

## 2024-11-07 RX ORDER — MEDROXYPROGESTERONE ACETATE 150 MG/ML
150 INJECTION, SUSPENSION INTRAMUSCULAR ONCE
Status: COMPLETED | OUTPATIENT
Start: 2024-11-07 | End: 2024-11-07

## 2024-11-07 NOTE — PROGRESS NOTES
Patient identified by name and date of birth   Patient received her last injection on 8/17/24  Patient is due for her next injection between 01/23/25 and 2/06/24  Patient last seen in August   Patient will need an annual exam in Jan  Patient states that her last menses is unknown due to injections  Patient denies any any hair loss, weight gain, joint/bone pain or mood changes  Patient denies any issues with abnormal bleeding, (just spotting every so often)   Patient denies any concerns with injection   Patient educated on the importance of compliance  Patient given injection in the left deltoid region, patient tolerated well  Patient encouraged to schedule return visit prior to leaving the office  Patient encouraged to call office if she has any future questions or concerns  Patient verbalized understanding

## 2025-01-28 ENCOUNTER — APPOINTMENT (OUTPATIENT)
Dept: OBSTETRICS AND GYNECOLOGY | Facility: HOSPITAL | Age: 35
End: 2025-01-28
Payer: COMMERCIAL

## (undated) DEVICE — SUTURE, MONOCRYL, 0, 36 IN, CTX, VIOLET

## (undated) DEVICE — DRAPE PACK, CESAREAN SECTION, CUSTOM, UHC

## (undated) DEVICE — SUTURE, MONOCRYL, 2-0, 27 IN, SH/V-20 , UNDYED

## (undated) DEVICE — TOWEL PACK, STERILE, 4/PACK, BLUE

## (undated) DEVICE — GLOVE, SURGICAL, PROTEXIS PI BLUE W/NEUTHERA, 8.0, PF, LF

## (undated) DEVICE — GLOVE, SURGICAL, PROTEXIS PI MICRO, 7.5, PF, LF

## (undated) DEVICE — SUTURE, VICRYL 0, 36 IN, CT-1, VIOLET

## (undated) DEVICE — SUTURE, PDS II, 0, 60 IN, CTX, VIOLET

## (undated) DEVICE — SUTURE, MONOCRYL, 2-0, 36 IN, CTX, VIOLET